# Patient Record
Sex: FEMALE | Race: WHITE | Employment: OTHER | URBAN - METROPOLITAN AREA
[De-identification: names, ages, dates, MRNs, and addresses within clinical notes are randomized per-mention and may not be internally consistent; named-entity substitution may affect disease eponyms.]

---

## 2017-01-16 RX ORDER — ESTRADIOL 0.04 MG/D
PATCH, EXTENDED RELEASE TRANSDERMAL
Qty: 8 PATCH | Refills: 0 | OUTPATIENT
Start: 2017-01-16

## 2017-01-16 NOTE — TELEPHONE ENCOUNTER
judit      Last Written Prescription Date:  10/20/16  Last Fill Quantity: 24,   # refills: 3  Last Office Visit with [facility] [place] primary care provider:  10/19/16  Future Office visit: none    Refill request too soon, Rx denied.

## 2017-08-03 ENCOUNTER — TRANSFERRED RECORDS (OUTPATIENT)
Dept: HEALTH INFORMATION MANAGEMENT | Facility: CLINIC | Age: 72
End: 2017-08-03

## 2017-08-24 ENCOUNTER — NURSE TRIAGE (OUTPATIENT)
Dept: NURSING | Facility: CLINIC | Age: 72
End: 2017-08-24

## 2017-08-24 ENCOUNTER — TELEPHONE (OUTPATIENT)
Dept: NURSING | Facility: CLINIC | Age: 72
End: 2017-08-24

## 2017-08-24 DIAGNOSIS — A60.00 GENITAL HERPES: Primary | ICD-10-CM

## 2017-08-24 RX ORDER — VALACYCLOVIR HYDROCHLORIDE 1 G/1
1000 TABLET, FILM COATED ORAL DAILY
Qty: 30 TABLET | Refills: 0 | Status: SHIPPED | OUTPATIENT
Start: 2017-08-24 | End: 2017-10-24

## 2017-08-24 NOTE — TELEPHONE ENCOUNTER
Valacyclovir 1gm      Last Written Prescription Date:  7/27/15  Last Fill Quantity: 90,   # refills: 3  Last Office Visit with [facility] [place] care provider:  10/19/16  Future Office visit: none    Routing refill request to provider for review/approval because:  Pt due for annual in October. Rx has not been filled for 2 years. Routing to on call provider to review and advise- ok for refill?

## 2017-08-24 NOTE — TELEPHONE ENCOUNTER
Had a spinal injury about one week ago. Then she broke out with genital herpes, for 5 days now. Requesting acyclovir tablets. [facility] on [address]. She has some old tablets, 2 years old. Doesn't know how potent they are. They aren't working so far. Please call.  Jessica Lua RN-[facility]

## 2017-08-24 NOTE — TELEPHONE ENCOUNTER
Sent a high priority message to the clinic for them to prescribe some acyclovir. She has been taking old tablets and they aren't working (2 years old).  Has had the outbreak for 5 days. Spinal injury 7 days ago.   Jessica Lua RN-[facility]

## 2017-08-25 ENCOUNTER — TELEPHONE (OUTPATIENT)
Dept: OBGYN | Facility: CLINIC | Age: 72
End: 2017-08-25

## 2017-08-25 NOTE — TELEPHONE ENCOUNTER
rec'd rejection from pharmacy for Vivelle Dot 0.0375mg CAMILLE as it is nonformulary. Per clinic notes Dr. Waldron made patient aware that brand will not be covered by insurance. No PA being done at this time.    [phone]  ID# [id number]

## 2017-10-24 ENCOUNTER — OFFICE VISIT (OUTPATIENT)
Dept: OBGYN | Facility: CLINIC | Age: 72
End: 2017-10-24
Payer: COMMERCIAL

## 2017-10-24 VITALS
BODY MASS INDEX: 22.56 KG/M2 | SYSTOLIC BLOOD PRESSURE: 148 MMHG | WEIGHT: 135.4 LBS | HEIGHT: 65 IN | DIASTOLIC BLOOD PRESSURE: 80 MMHG

## 2017-10-24 DIAGNOSIS — Z23 NEED FOR PROPHYLACTIC VACCINATION AND INOCULATION AGAINST INFLUENZA: ICD-10-CM

## 2017-10-24 DIAGNOSIS — Z13.6 ENCOUNTER FOR LIPID SCREENING FOR CARDIOVASCULAR DISEASE: ICD-10-CM

## 2017-10-24 DIAGNOSIS — A60.00 GENITAL HERPES SIMPLEX, UNSPECIFIED SITE: ICD-10-CM

## 2017-10-24 DIAGNOSIS — Z13.29 SCREENING FOR THYROID DISORDER: ICD-10-CM

## 2017-10-24 DIAGNOSIS — Z79.890 HORMONE REPLACEMENT THERAPY: ICD-10-CM

## 2017-10-24 DIAGNOSIS — Z13.228 SCREENING FOR METABOLIC DISORDER: ICD-10-CM

## 2017-10-24 DIAGNOSIS — E55.9 VITAMIN D DEFICIENCY: ICD-10-CM

## 2017-10-24 DIAGNOSIS — Z01.419 ENCOUNTER FOR GYNECOLOGICAL EXAMINATION WITHOUT ABNORMAL FINDING: Primary | ICD-10-CM

## 2017-10-24 DIAGNOSIS — Z13.220 ENCOUNTER FOR LIPID SCREENING FOR CARDIOVASCULAR DISEASE: ICD-10-CM

## 2017-10-24 LAB
ALBUMIN SERPL-MCNC: 4.4 G/DL (ref 3.4–5)
ALP SERPL-CCNC: 74 U/L (ref 40–150)
ALT SERPL W P-5'-P-CCNC: 25 U/L (ref 0–50)
ANION GAP SERPL CALCULATED.3IONS-SCNC: 10 MMOL/L (ref 3–14)
AST SERPL W P-5'-P-CCNC: 22 U/L (ref 0–45)
BILIRUB SERPL-MCNC: 0.8 MG/DL (ref 0.2–1.3)
BUN SERPL-MCNC: 15 MG/DL (ref 7–30)
CALCIUM SERPL-MCNC: 9.1 MG/DL (ref 8.5–10.1)
CHLORIDE SERPL-SCNC: 104 MMOL/L (ref 94–109)
CHOLEST SERPL-MCNC: 236 MG/DL
CO2 SERPL-SCNC: 26 MMOL/L (ref 20–32)
CREAT SERPL-MCNC: 0.79 MG/DL (ref 0.52–1.04)
GFR SERPL CREATININE-BSD FRML MDRD: 72 ML/MIN/1.7M2
GLUCOSE SERPL-MCNC: 83 MG/DL (ref 70–99)
HDLC SERPL-MCNC: 91 MG/DL
LDLC SERPL CALC-MCNC: 132 MG/DL
NONHDLC SERPL-MCNC: 145 MG/DL
POTASSIUM SERPL-SCNC: 3.9 MMOL/L (ref 3.4–5.3)
PROT SERPL-MCNC: 7.5 G/DL (ref 6.8–8.8)
SODIUM SERPL-SCNC: 140 MMOL/L (ref 133–144)
TRIGL SERPL-MCNC: 66 MG/DL
TSH SERPL DL<=0.005 MIU/L-ACNC: 2.49 MU/L (ref 0.4–4)

## 2017-10-24 PROCEDURE — 80061 LIPID PANEL: CPT | Performed by: OBSTETRICS & GYNECOLOGY

## 2017-10-24 PROCEDURE — G0008 ADMIN INFLUENZA VIRUS VAC: HCPCS | Performed by: OBSTETRICS & GYNECOLOGY

## 2017-10-24 PROCEDURE — 36415 COLL VENOUS BLD VENIPUNCTURE: CPT | Performed by: OBSTETRICS & GYNECOLOGY

## 2017-10-24 PROCEDURE — 82306 VITAMIN D 25 HYDROXY: CPT | Performed by: OBSTETRICS & GYNECOLOGY

## 2017-10-24 PROCEDURE — 90662 IIV NO PRSV INCREASED AG IM: CPT | Performed by: OBSTETRICS & GYNECOLOGY

## 2017-10-24 PROCEDURE — 99397 PER PM REEVAL EST PAT 65+ YR: CPT | Mod: 25 | Performed by: OBSTETRICS & GYNECOLOGY

## 2017-10-24 PROCEDURE — 80053 COMPREHEN METABOLIC PANEL: CPT | Performed by: OBSTETRICS & GYNECOLOGY

## 2017-10-24 PROCEDURE — 84443 ASSAY THYROID STIM HORMONE: CPT | Performed by: OBSTETRICS & GYNECOLOGY

## 2017-10-24 RX ORDER — ESTRADIOL 0.04 MG/D
1 PATCH, EXTENDED RELEASE TRANSDERMAL
Qty: 24 PATCH | Refills: 3 | Status: SHIPPED | OUTPATIENT
Start: 2017-10-26 | End: 2018-10-24

## 2017-10-24 RX ORDER — VALACYCLOVIR HYDROCHLORIDE 1 G/1
1000 TABLET, FILM COATED ORAL DAILY
Qty: 90 TABLET | Refills: 3 | Status: SHIPPED | OUTPATIENT
Start: 2017-10-24 | End: 2018-12-12

## 2017-10-24 ASSESSMENT — PATIENT HEALTH QUESTIONNAIRE - PHQ9
SUM OF ALL RESPONSES TO PHQ QUESTIONS 1-9: 0
5. POOR APPETITE OR OVEREATING: NOT AT ALL

## 2017-10-24 ASSESSMENT — ANXIETY QUESTIONNAIRES
3. WORRYING TOO MUCH ABOUT DIFFERENT THINGS: NOT AT ALL
1. FEELING NERVOUS, ANXIOUS, OR ON EDGE: NOT AT ALL
5. BEING SO RESTLESS THAT IT IS HARD TO SIT STILL: NOT AT ALL
2. NOT BEING ABLE TO STOP OR CONTROL WORRYING: NOT AT ALL
GAD7 TOTAL SCORE: 0
7. FEELING AFRAID AS IF SOMETHING AWFUL MIGHT HAPPEN: NOT AT ALL
6. BECOMING EASILY ANNOYED OR IRRITABLE: NOT AT ALL

## 2017-10-24 NOTE — PROGRESS NOTES

## 2017-10-24 NOTE — PROGRESS NOTES
Cheyenne is a 72 year old  female who presents for annual exam.     Besides routine health maintenance, she has no other health concerns today .    Do you have a Health Care Directive?: Yes: Patient states has Advance Directive and will bring in a copy to clinic.    Fall risk:   Fallen 2 or more times in the past year?: No  Any fall with injury in the past year?: No      HPI:  The patient's PCP is Johanna Waldron MD.    Patient doing well  Prior hysterectomy  Had herniated disc this year  Refill vivelle dot  Stopped smoking        GYNECOLOGIC HISTORY:No LMP recorded. Patient has had a hysterectomy..   reports that she has quit smoking. Her smoking use included Cigarettes. She has a 8.00 pack-year smoking history. She quit smokeless tobacco use about 36 years ago.      Patient is sexually active.  STD testing offered?  Declined  Last PHQ-9 score on record=   PHQ-9 SCORE 10/24/2017   Total Score 0     Last GAD7 score on record=   DIDIER-7 SCORE 10/19/2016 10/24/2017   Total Score 0 0     Alcohol Score = 2    HEALTH MAINTENANCE:  Cholesterol: 13   Total= 243, Triglycerides=53, HDL=99, GLE=421, TSH=2.55 -Patient is fasting for labs today  Last Mammo: 8/3/17, Result: normal, Next Mammo: 2018. Has done with [facility]   Pap: EDEN  DEXA:  7/16/15  Colonoscopy:  5/3/16, Result:  normal, Next Colonoscopy: due in 2 years.    Health maintenance updated:  yes    HISTORY:  Obstetric History       T2      L0     SAB0   TAB0   Ectopic0   Multiple0   Live Births0       # Outcome Date GA Lbr Kike/2nd Weight Sex Delivery Anes PTL Lv   2 Term            1 Term                 Patient Active Problem List   Diagnosis     IBS (irritable bowel syndrome)     Diverticulitis of colon     OAB (overactive bladder)     Past Surgical History:   Procedure Laterality Date     ABDOMINOPLASTY       C BREAST AUGMENTATION       HYSTERECTOMY, EDEN  1998     PARATHYROIDECTOMY        Social History   Substance Use  "Topics     Smoking status: Former Smoker     Packs/day: 1.00     Years: 8.00     Types: Cigarettes     Smokeless tobacco: Former User     Quit date: 9/13/1981     Alcohol use Yes      Comment: very light           Current Outpatient Prescriptions   Medication Sig     valACYclovir (VALTREX) 1000 mg tablet Take 1 tablet (1,000 mg) by mouth daily     Lactobacillus (PROBIOTIC ACIDOPHILUS PO)      RaNITidine HCl (ZANTAC 75 PO)      VIVELLE-DOT 0.0375 MG/24HR Place 1 patch onto the skin twice a week     cycloSPORINE (RESTASIS) 0.05 % ophthalmic emulsion 1 drop as needed.     Multiple Vitamins-Minerals (MULTIVITAL) TABS Take 1 tablet by mouth daily.     Calcium Carbonate-Vitamin D (CALCIUM + D) 600-200 MG-UNIT per tablet Take 1 tablet by mouth 2 times daily.     No current facility-administered medications for this visit.        Allergies   Allergen Reactions     Flagyl [Metronidazole Hcl]      Thrush and gerd       Past medical, surgical, social and family history were reviewed and updated in EPIC.    ROS:   12 point review of systems negative other than symptoms noted below.    EXAM:  /80  Ht 5' 4.5\" (1.638 m)  Wt 135 lb 6.4 oz (61.4 kg)  BMI 22.88 kg/m2   BMI: Body mass index is 22.88 kg/(m^2).    EXAM:  Constitutional: Appearance: Well nourished, well developed alert, in no acute distress  Neck:  Lymph Nodes:  No lymphadenopathy present    Thyroid:  Gland size normal, nontender, no nodules or masses present  on palpation  Chest:  Respiratory Effort:  Breathing unlabored  Cardiovascular:Heart    Auscultation:  Regular rate, normal rhythm, no murmurs present  Breasts: Inspection of Breasts:  No lymphadenopathy present., Palpation of Breasts and Axillae:  No masses present on palpation, no breast tenderness., Axillary Lymph Nodes:  No lymphadenopathy present. and No nodularity, asymmetry or nipple discharge bilaterally.  Gastrointestinal:  Abdominal Examination:  Abdomen nontender to palpation, tone normal " without     rigidity or guarding, no masses present, umbilicus without lesions    Liver and speen:  No hepatomegaly present, liver nontender to palpation    Hernias:  No hernias present  Lymphatic: Lymph Nodes:  No other lymphadenopathy present  Skin:  General Inspection:  No rashes present, no lesions present, no areas of  discoloration.    Genitalia and Groin:  No rashes present, no lesions present, no areas of  discoloration, no masses present  Neurologic/Psychiatric:    Mental Status:  Oriented X3     Pelvic Exam:  External Genitalia:     Normal appearance for age, no discharge present, no tenderness present, no inflammatory lesions present, color normal  Vagina:     Normal vaginal vault without central or paravaginal defects, no discharge present, no inflammatory lesions present, no masses present  Bladder:     Nontender to palpation  Urethra:   Urethral Body:  Urethra palpation normal, urethra structural support normal   Urethral Meatus:  No erythema or lesions present  Cervix:     Surgically absent  Uterus:     Surgically absent  Adnexa:     No adnexal tenderness present, no adnexal masses present  Perineum:     Perineum within normal limits, no evidence of trauma, no rashes or skin lesions present  Anus:     Anus within normal limits, no hemorrhoids present  Inguinal Lymph Nodes:     No lymphadenopathy present  Pubic Hair:     Normal pubic hair distribution for age  Genitalia and Groin:     No rashes present, no lesions present, no areas of discoloration, no masses present      COUNSELING:   Reviewed preventive health counseling, as reflected in patient instructions       Regular exercise       Healthy diet/nutrition    BMI:  Body mass index is 22.88 kg/(m^2).     reports that she has quit smoking. Her smoking use included Cigarettes. She has a 8.00 pack-year smoking history. She quit smokeless tobacco use about 36 years ago.        ASSESSMENT:  72 year old female with satisfactory annual exam.  ICD-10-CM     1. Encounter for gynecological examination without abnormal finding Z01.419    2. Hormone replacement therapy Z79.890    3. Genital herpes A60.00        PLAN:  Return 1 years  Refilled estrogen patch and valtrex  Annual mammogram    Johanna Waldron MD

## 2017-10-24 NOTE — MR AVS SNAPSHOT
"              After Visit Summary   10/24/2017    Cheyenne Mckeon    MRN: [id number]           Patient Information     Date Of Birth          1945        Visit Information        Provider Department      10/24/2017 8:40 AM Johanna Waldron MD [facility]        Today's Diagnoses     Encounter for gynecological examination without abnormal finding    -  1    Hormone replacement therapy        Genital herpes        Encounter for lipid screening for cardiovascular disease        Screening for metabolic disorder        Screening for thyroid disorder        Vitamin D deficiency        Need for prophylactic vaccination and inoculation against influenza           Follow-ups after your visit        Who to contact     If you have questions or need follow up information about today's clinic visit or your schedule please contact [facility] directly at [phone].  Normal or non-critical lab and imaging results will be communicated to you by MyChart, letter or phone within 4 business days after the clinic has received the results. If you do not hear from us within 7 days, please contact the clinic through [employer] or phone. If you have a critical or abnormal lab result, we will notify you by phone as soon as possible.  Submit refill requests through [facility] or call your pharmacy and they will forward the refill request to us. Please allow 3 business days for your refill to be completed.          Additional Information About Your Visit        MyChart Information     [facility] lets you send messages to your doctor, view your test results, renew your prescriptions, schedule appointments and more. To sign up, go to [email or web address] . Click on \"Log in\" on the left side of the screen, which will take you to the Welcome page. Then click on \"Sign up Now\" on the right side of the page.     You will be asked to enter the access code listed below, as well as some personal information. " "Please follow the directions to create your username and password.     Your access code is: [email or web address]  Expires: 2018  9:44 AM     Your access code will  in 90 days. If you need help or a new code, please call your [place] clinic or [phone].        Care EveryWhere ID     This is your Care EveryWhere ID. This could be used by other organizations to access your [place] medical records  [id number]        Your Vitals Were     Height BMI (Body Mass Index)                5' 4.5\" (1.638 m) 22.88 kg/m2           Blood Pressure from Last 3 Encounters:   10/24/17 148/80   10/19/16 128/90   10/28/15 120/84    Weight from Last 3 Encounters:   10/24/17 135 lb 6.4 oz (61.4 kg)   10/19/16 141 lb (64 kg)   10/28/15 137 lb (62.1 kg)              We Performed the Following     Comprehensive metabolic panel     FLU VACCINE, INCREASED ANTIGEN, PRESV FREE, AGE 65+ [13393]     Lipid panel reflex to direct LDL Fasting     TSH     Vaccine Administration, Initial [40671]     Vitamin D Deficiency        Primary Care Provider Office Phone # Fax #    Johanna Waldron [phone] [address] [address]        Equal Access to Services     [facility]: Hadii milton ku hadasho Soomaali, waaxda luqadaha, qaybta kaalmada adeegcatrachito, meng white . So [facility] [phone].    ATENCIÓN: Si habla español, tiene a nunez disposición servicios gratuitos de asistencia lingüística. Llame al [phone].    We comply with applicable federal civil rights laws and [place] laws. We do not discriminate on the basis of race, color, national origin, age, disability, sex, sexual orientation, or gender identity.            Thank you!     Thank you for choosing [facility]  for your care. Our goal is always to provide you with excellent care. Hearing back from our patients is one way we can continue to improve our services. Please take a few minutes to complete " the written survey that you may receive in the mail after your visit with us. Thank you!             Your Updated Medication List - Protect others around you: Learn how to safely use, store and throw away your medicines at www.disposemymeds.org.          This list is accurate as of: 10/24/17  9:44 AM.  Always use your most recent med list.                   Brand Name Dispense Instructions for use Diagnosis    calcium + D 600-200 MG-UNIT Tabs   Generic drug:  calcium carbonate-vitamin D      Take 1 tablet by mouth 2 times daily.        MULTIVITAL Tabs      Take 1 tablet by mouth daily.        PROBIOTIC ACIDOPHILUS PO           RESTASIS 0.05 % ophthalmic emulsion   Generic drug:  cycloSPORINE      1 drop as needed.        valACYclovir 1000 mg tablet    VALTREX    30 tablet    Take 1 tablet (1,000 mg) by mouth daily    Genital herpes       VIVELLE-DOT 0.0375 MG/24HR BIW patch   Generic drug:  estradiol     24 patch    Place 1 patch onto the skin twice a week    Hormone replacement therapy       ZANTAC 75 PO

## 2017-10-25 LAB — DEPRECATED CALCIDIOL+CALCIFEROL SERPL-MC: 49 UG/L (ref 20–75)

## 2017-10-25 ASSESSMENT — ANXIETY QUESTIONNAIRES: GAD7 TOTAL SCORE: 0

## 2018-03-21 ENCOUNTER — TELEPHONE (OUTPATIENT)
Dept: OBGYN | Facility: CLINIC | Age: 73
End: 2018-03-21

## 2018-03-21 NOTE — TELEPHONE ENCOUNTER
Prior Authorization Retail Medication Request    Medication/Dose: brad heck 0.0375mg patch  ICD code (if different than what is on RX):    Previously Tried and Failed:    Rationale:  Has been stable on this med    Insurance Name:    Insurance ID:  [id number]  Phone: [phone]      Pharmacy Information (if different than what is on RX)  Name:    Phone:

## 2018-03-23 NOTE — TELEPHONE ENCOUNTER
Central Prior Authorization Team   Phone: [phone]      PA Initiation    Medication: vivelle dot 0.0375mg patch-Initiated  Insurance Company: Medicare Blue - Phone [phone] Fax [phone]  Pharmacy Filling the Rx: [facility] [address] [place] [address] [place], [place] - [address] AT [facility] [address] [place] [address]  Filling Pharmacy Phone: [phone]  Filling Pharmacy Fax:    Start Date: 3/23/2018

## 2018-03-26 NOTE — TELEPHONE ENCOUNTER
Prior Authorization Approval    Authorization Effective Date: 12/24/2017  Authorization Expiration Date: 3/24/2019  Medication: vivelle dot 0.0375mg patch-APPROVED  Approved Dose/Quantity:   Reference #:     Insurance Company: Medicare Blue - Phone [phone] Fax [phone]  Expected CoPay: $61     CoPay Card Available:      Foundation Assistance Needed:    Which Pharmacy is filling the prescription (Not needed for infusion/clinic administered): [facility] - [place], [place] - [address] AT [facility] [place] [address]  Pharmacy Notified: Yes  Patient Notified: Yes

## 2018-08-07 ENCOUNTER — TRANSFERRED RECORDS (OUTPATIENT)
Dept: HEALTH INFORMATION MANAGEMENT | Facility: CLINIC | Age: 73
End: 2018-08-07

## 2018-08-15 ENCOUNTER — OFFICE VISIT (OUTPATIENT)
Dept: OBGYN | Facility: CLINIC | Age: 73
End: 2018-08-15
Payer: COMMERCIAL

## 2018-08-15 VITALS
WEIGHT: 140.6 LBS | BODY MASS INDEX: 23.76 KG/M2 | DIASTOLIC BLOOD PRESSURE: 74 MMHG | SYSTOLIC BLOOD PRESSURE: 118 MMHG | HEART RATE: 84 BPM

## 2018-08-15 DIAGNOSIS — R30.0 DYSURIA: Primary | ICD-10-CM

## 2018-08-15 LAB
ALBUMIN UR-MCNC: NEGATIVE MG/DL
APPEARANCE UR: CLEAR
BILIRUB UR QL STRIP: NEGATIVE
COLOR UR AUTO: YELLOW
GLUCOSE UR STRIP-MCNC: NEGATIVE MG/DL
HGB UR QL STRIP: NEGATIVE
KETONES UR STRIP-MCNC: NEGATIVE MG/DL
LEUKOCYTE ESTERASE UR QL STRIP: NEGATIVE
NITRATE UR QL: NEGATIVE
NON-SQ EPI CELLS #/AREA URNS LPF: NORMAL /LPF
PH UR STRIP: 7 PH (ref 5–7)
RBC #/AREA URNS AUTO: NORMAL /HPF
SOURCE: NORMAL
SP GR UR STRIP: 1.01 (ref 1–1.03)
UROBILINOGEN UR STRIP-ACNC: 1 EU/DL (ref 0.2–1)
WBC #/AREA URNS AUTO: NORMAL /HPF

## 2018-08-15 PROCEDURE — 87086 URINE CULTURE/COLONY COUNT: CPT | Performed by: NURSE PRACTITIONER

## 2018-08-15 PROCEDURE — 99212 OFFICE O/P EST SF 10 MIN: CPT | Performed by: NURSE PRACTITIONER

## 2018-08-15 PROCEDURE — 81001 URINALYSIS AUTO W/SCOPE: CPT | Performed by: NURSE PRACTITIONER

## 2018-08-15 NOTE — PROGRESS NOTES
SUBJECTIVE:                                                   Cheyenne Mckeon is a 72 year old female who presents to clinic today for the following health issue(s):  Patient presents with:  Urinary Problem: Patient was diagnosed with UTI from [facility]. Took macrobid for UTI but felt sick from taking it. Patient states she has chills, body aches, fever and joint pains since yesterday. Patient stopped taking the Macrobid.       HPI: patient was seen at [facility] 2 days ago and was told she had a UTI.  She was given macrobid and had a  Reaction to medication.      No LMP recorded. Patient has had a hysterectomy..   Patient is sexually active, .  Using hysterectomy for contraception.    reports that she has quit smoking. Her smoking use included Cigarettes. She has a 8.00 pack-year smoking history. She quit smokeless tobacco use about 36 years ago.    STD testing offered?  Declined    Health maintenance updated:  yes    Today's PHQ-2 Score:   PHQ-2 (  [employer]) 10/28/2015   Q1: Little interest or pleasure in doing things 0   Q2: Feeling down, depressed or hopeless 0   PHQ-2 Score 0     Today's PHQ-9 Score:   PHQ-9 SCORE 10/24/2017   Total Score 0     Today's DIDIER-7 Score:   DIDIER-7 SCORE 10/24/2017   Total Score 0       Problem list and histories reviewed & adjusted, as indicated.  Additional history: as documented.    Patient Active Problem List   Diagnosis     IBS (irritable bowel syndrome)     Diverticulitis of colon     OAB (overactive bladder)     Past Surgical History:   Procedure Laterality Date     ABDOMINOPLASTY       C BREAST AUGMENTATION       HYSTERECTOMY, EDEN       PARATHYROIDECTOMY        Social History   Substance Use Topics     Smoking status: Former Smoker     Packs/day: 1.00     Years: 8.00     Types: Cigarettes     Smokeless tobacco: Former User     Quit date: 1981     Alcohol use Yes      Comment: very light           Current Outpatient Prescriptions   Medication Sig      Calcium Carbonate-Vitamin D (CALCIUM + D) 600-200 MG-UNIT per tablet Take 1 tablet by mouth 2 times daily.     cycloSPORINE (RESTASIS) 0.05 % ophthalmic emulsion 1 drop as needed.     Lactobacillus (PROBIOTIC ACIDOPHILUS PO)      Multiple Vitamins-Minerals (MULTIVITAL) TABS Take 1 tablet by mouth daily.     RaNITidine HCl (ZANTAC 75 PO)      valACYclovir (VALTREX) 1000 mg tablet Take 1 tablet (1,000 mg) by mouth daily     VIVELLE-DOT 0.0375 MG/24HR BIW patch Place 1 patch onto the skin twice a week     No current facility-administered medications for this visit.      Allergies   Allergen Reactions     Flagyl [Metronidazole Hcl]      Thrush and gerd     Macrobid [Nitrofurantoin]        ROS:  12 point review of systems negative other than symptoms noted below.  Genitourinary: Painful Urination    OBJECTIVE:     /74  Pulse 84  Wt 140 lb 9.6 oz (63.8 kg)  Breastfeeding? No  BMI 23.76 kg/m2  Body mass index is 23.76 kg/(m^2).    Exam:  Constitutional:  Appearance: Well nourished, well developed alert, in no acute distress   Patient states she was very nauseated, running a fever and felt severe body aches yesterday after taking macrobid x 2.  She stopped the medication and today she feels normal.  Wants urine checked to make sure she doesn/t have a UTI.  Patient denies any dysuria today, but wants a UC to make sure.    In-Clinic Test Results:  Results for orders placed or performed in visit on 08/15/18 (from the past 24 hour(s))   UA with Microscopic   Result Value Ref Range    Color Urine Yellow     Appearance Urine Clear     Glucose Urine Negative NEG^Negative mg/dL    Bilirubin Urine Negative NEG^Negative    Ketones Urine Negative NEG^Negative mg/dL    Specific Gravity Urine 1.010 1.003 - 1.035    pH Urine 7.0 5.0 - 7.0 pH    Protein Albumin Urine Negative NEG^Negative mg/dL    Urobilinogen Urine 1.0 0.2 - 1.0 EU/dL    Nitrite Urine Negative NEG^Negative    Blood Urine Negative NEG^Negative    Leukocyte  Esterase Urine Negative NEG^Negative    Source Midstream Urine     WBC Urine 0 - 5 OTO5^0 - 5 /HPF    RBC Urine O - 2 OTO2^O - 2 /HPF    Squamous Epithelial /LPF Urine Few FEW^Few /LPF       ASSESSMENT/PLAN:                                                        ICD-10-CM    1. Dysuria R30.0 UA with Microscopic     Urine Culture Aerobic Bacterial       There are no Patient Instructions on file for this visit.    Will send a UC to make sure no infection.  Will notify patient with results when available  Return prn.    FISH Harris CNP  [facility]

## 2018-08-15 NOTE — MR AVS SNAPSHOT
"              After Visit Summary   8/15/2018    Cheyenne Mckeon    MRN: [id number]           Patient Information     Date Of Birth          1945        Visit Information        Provider Department      8/15/2018 10:00 AM Magdalene Lee APRN CNP [facility]        Today's Diagnoses     Dysuria    -  1       Follow-ups after your visit        Follow-up notes from your care team     Return if symptoms worsen or fail to improve.      Who to contact     If you have questions or need follow up information about today's clinic visit or your schedule please contact [facility] directly at [phone].  Normal or non-critical lab and imaging results will be communicated to you by [employer], letter or phone within 4 business days after the clinic has received the results. If you do not hear from us within 7 days, please contact the clinic through [employer] or phone. If you have a critical or abnormal lab result, we will notify you by phone as soon as possible.  Submit refill requests through [employer] or call your pharmacy and they will forward the refill request to us. Please allow 3 business days for your refill to be completed.          Additional Information About Your Visit        MyChart Information     [employer] lets you send messages to your doctor, view your test results, renew your prescriptions, schedule appointments and more. To sign up, go to [email or web address] . Click on \"Log in\" on the left side of the screen, which will take you to the Welcome page. Then click on \"Sign up Now\" on the right side of the page.     You will be asked to enter the access code listed below, as well as some personal information. Please follow the directions to create your username and password.     Your access code is: [email or web address]  Expires: 2018  9:45 AM     Your access code will  in 90 days. If you need help or a new code, please call your [place] clinic or " [phone].        Care EveryWhere ID     This is your Care EveryWhere ID. This could be used by other organizations to access your [place] medical records  [id number]        Your Vitals Were     Pulse Breastfeeding? BMI (Body Mass Index)             84 No 23.76 kg/m2          Blood Pressure from Last 3 Encounters:   08/15/18 118/74   10/24/17 148/80   10/19/16 128/90    Weight from Last 3 Encounters:   08/15/18 140 lb 9.6 oz (63.8 kg)   10/24/17 135 lb 6.4 oz (61.4 kg)   10/19/16 141 lb (64 kg)              We Performed the Following     UA with Microscopic     Urine Culture Aerobic Bacterial        Primary Care Provider Office Phone # Fax #    Johanna Waldron [phone] [address] [address]        Equal Access to Services     [facility]: Hadii aad ku hadasho Soomaali, waaxda luqadaha, qaybta kaalmada adeegyada, meng arguellesin hayverna white . So [facility] [place] [phone].    ATENCIÓN: Si habla español, tiene a nunez disposición servicios gratuitos de asistencia lingüística. Llame al [phone].    We comply with applicable federal civil rights laws and [place] laws. We do not discriminate on the basis of race, color, national origin, age, disability, sex, sexual orientation, or gender identity.            Thank you!     Thank you for choosing [facility]  for your care. Our goal is always to provide you with excellent care. Hearing back from our patients is one way we can continue to improve our services. Please take a few minutes to complete the written survey that you may receive in the mail after your visit with us. Thank you!             Your Updated Medication List - Protect others around you: Learn how to safely use, store and throw away your medicines at www.disposemymeds.org.          This list is accurate as of 8/15/18 10:22 AM.  Always use your most recent med list.                   Brand Name Dispense Instructions for use  Diagnosis    calcium + D 600-200 MG-UNIT Tabs   Generic drug:  calcium carbonate-vitamin D      Take 1 tablet by mouth 2 times daily.        MULTIVITAL Tabs      Take 1 tablet by mouth daily.        PROBIOTIC ACIDOPHILUS PO           RESTASIS 0.05 % ophthalmic emulsion   Generic drug:  cycloSPORINE      1 drop as needed.        valACYclovir 1000 mg tablet    VALTREX    90 tablet    Take 1 tablet (1,000 mg) by mouth daily    Genital herpes simplex, unspecified site       VIVELLE-DOT 0.0375 MG/24HR BIW patch   Generic drug:  estradiol     24 patch    Place 1 patch onto the skin twice a week    Hormone replacement therapy       ZANTAC 75 PO

## 2018-08-16 LAB
BACTERIA SPEC CULT: NO GROWTH
Lab: NORMAL
SPECIMEN SOURCE: NORMAL

## 2018-10-24 DIAGNOSIS — Z79.890 HORMONE REPLACEMENT THERAPY: ICD-10-CM

## 2018-10-24 NOTE — TELEPHONE ENCOUNTER
"Requested Prescriptions   Pending Prescriptions Disp Refills     VIVELLE-DOT 0.0375 MG/24HR BIW patch [Pharmacy Med Name: VIVELLE-DOT 0.0375MG PTCH(TWICE WK)] 24 patch 0     Sig: APPLY 1 PATCH EXTERNALLY TO THE SKIN 2 TIMES A WEEK    Hormone Replacement Therapy Passed    10/24/2018  8:21 AM       Passed - Blood pressure under 140/90 in past 12 months    BP Readings from Last 3 Encounters:   08/15/18 118/74   10/24/17 148/80   10/19/16 128/90                Passed - Recent (12 mo) or future (30 days) visit within the authorizing provider's specialty    Patient had office visit in the last 12 months or has a visit in the next 30 days with authorizing provider or within the authorizing provider's specialty.  See \"Patient Info\" tab in inbasket, or \"Choose Columns\" in Meds & Orders section of the refill encounter.             Passed - Patient has mammogram in past 2 years on file if age 50-75       Passed - Patient is 18 years of age or older       Passed - No active pregnancy on record       Passed - No positive pregnancy test on record in past 12 months        Last Written Prescription Date:  10/26/2017  Last Fill Quantity: 24,  # refills: 3   Last office visit: 8/15/2018 with prescribing provider:  Lula Lee   Future Office Visit:  None    "

## 2018-10-26 RX ORDER — ESTRADIOL 0.04 MG/D
1 PATCH, EXTENDED RELEASE TRANSDERMAL
Qty: 24 PATCH | Refills: 0 | Status: SHIPPED | OUTPATIENT
Start: 2018-10-29 | End: 2018-12-12

## 2018-10-26 NOTE — TELEPHONE ENCOUNTER
Medication is being filled for 1 time refill only due to:  Patient needs to be seen because it has been more than one year since last visit. Reminder sent via pharmacy

## 2018-12-12 ENCOUNTER — OFFICE VISIT (OUTPATIENT)
Dept: OBGYN | Facility: CLINIC | Age: 73
End: 2018-12-12
Payer: COMMERCIAL

## 2018-12-12 VITALS
BODY MASS INDEX: 23.22 KG/M2 | HEART RATE: 78 BPM | WEIGHT: 136 LBS | SYSTOLIC BLOOD PRESSURE: 116 MMHG | HEIGHT: 64 IN | DIASTOLIC BLOOD PRESSURE: 78 MMHG

## 2018-12-12 DIAGNOSIS — Z13.6 ENCOUNTER FOR LIPID SCREENING FOR CARDIOVASCULAR DISEASE: Primary | ICD-10-CM

## 2018-12-12 DIAGNOSIS — Z13.228 SCREENING FOR METABOLIC DISORDER: ICD-10-CM

## 2018-12-12 DIAGNOSIS — Z79.890 HORMONE REPLACEMENT THERAPY: ICD-10-CM

## 2018-12-12 DIAGNOSIS — A60.00 GENITAL HERPES SIMPLEX, UNSPECIFIED SITE: ICD-10-CM

## 2018-12-12 DIAGNOSIS — Z13.220 ENCOUNTER FOR LIPID SCREENING FOR CARDIOVASCULAR DISEASE: Primary | ICD-10-CM

## 2018-12-12 LAB
ALBUMIN SERPL-MCNC: 4 G/DL (ref 3.4–5)
ALP SERPL-CCNC: 66 U/L (ref 40–150)
ALT SERPL W P-5'-P-CCNC: 27 U/L (ref 0–50)
ANION GAP SERPL CALCULATED.3IONS-SCNC: 9 MMOL/L (ref 3–14)
AST SERPL W P-5'-P-CCNC: 16 U/L (ref 0–45)
BILIRUB SERPL-MCNC: 0.7 MG/DL (ref 0.2–1.3)
BUN SERPL-MCNC: 18 MG/DL (ref 7–30)
CALCIUM SERPL-MCNC: 9.1 MG/DL (ref 8.5–10.1)
CHLORIDE SERPL-SCNC: 103 MMOL/L (ref 94–109)
CHOLEST SERPL-MCNC: 247 MG/DL
CO2 SERPL-SCNC: 26 MMOL/L (ref 20–32)
CREAT SERPL-MCNC: 0.83 MG/DL (ref 0.52–1.04)
GFR SERPL CREATININE-BSD FRML MDRD: 67 ML/MIN/1.7M2
GLUCOSE SERPL-MCNC: 83 MG/DL (ref 70–99)
HDLC SERPL-MCNC: 87 MG/DL
LDLC SERPL CALC-MCNC: 149 MG/DL
NONHDLC SERPL-MCNC: 160 MG/DL
POTASSIUM SERPL-SCNC: 3.7 MMOL/L (ref 3.4–5.3)
PROT SERPL-MCNC: 7 G/DL (ref 6.8–8.8)
SODIUM SERPL-SCNC: 138 MMOL/L (ref 133–144)
TRIGL SERPL-MCNC: 53 MG/DL

## 2018-12-12 PROCEDURE — 36415 COLL VENOUS BLD VENIPUNCTURE: CPT | Performed by: OBSTETRICS & GYNECOLOGY

## 2018-12-12 PROCEDURE — 99213 OFFICE O/P EST LOW 20 MIN: CPT | Performed by: OBSTETRICS & GYNECOLOGY

## 2018-12-12 PROCEDURE — 80061 LIPID PANEL: CPT | Performed by: OBSTETRICS & GYNECOLOGY

## 2018-12-12 PROCEDURE — 80053 COMPREHEN METABOLIC PANEL: CPT | Performed by: OBSTETRICS & GYNECOLOGY

## 2018-12-12 RX ORDER — VALACYCLOVIR HYDROCHLORIDE 1 G/1
1000 TABLET, FILM COATED ORAL DAILY
Qty: 90 TABLET | Refills: 3 | Status: SHIPPED | OUTPATIENT
Start: 2018-12-12 | End: 2020-08-18

## 2018-12-12 RX ORDER — ESTRADIOL 0.04 MG/D
1 PATCH, EXTENDED RELEASE TRANSDERMAL
Qty: 24 PATCH | Refills: 3 | Status: SHIPPED | OUTPATIENT
Start: 2018-12-13 | End: 2019-12-31

## 2018-12-12 ASSESSMENT — MIFFLIN-ST. JEOR: SCORE: 1116.89

## 2018-12-12 ASSESSMENT — ANXIETY QUESTIONNAIRES
7. FEELING AFRAID AS IF SOMETHING AWFUL MIGHT HAPPEN: NOT AT ALL
1. FEELING NERVOUS, ANXIOUS, OR ON EDGE: NOT AT ALL
6. BECOMING EASILY ANNOYED OR IRRITABLE: NOT AT ALL
2. NOT BEING ABLE TO STOP OR CONTROL WORRYING: NOT AT ALL
5. BEING SO RESTLESS THAT IT IS HARD TO SIT STILL: NOT AT ALL
IF YOU CHECKED OFF ANY PROBLEMS ON THIS QUESTIONNAIRE, HOW DIFFICULT HAVE THESE PROBLEMS MADE IT FOR YOU TO DO YOUR WORK, TAKE CARE OF THINGS AT HOME, OR GET ALONG WITH OTHER PEOPLE: NOT DIFFICULT AT ALL

## 2018-12-12 ASSESSMENT — PATIENT HEALTH QUESTIONNAIRE - PHQ9
SUM OF ALL RESPONSES TO PHQ QUESTIONS 1-9: 0
5. POOR APPETITE OR OVEREATING: NOT AT ALL

## 2018-12-12 NOTE — PROGRESS NOTES
SUBJECTIVE:                                                   Cheyenne Mckeon is a 73 year old female who presents to clinic today for the following health issue(s):  Patient presents with:  Other: LTD Yearly           HPI:  Patient is doing well  Prior EDEN  Has primary doctor  Needs refill on her hormone replacement therapy  Has been having some palpitations occ  Former smoker in distant past  Has breast implants  Normal Vit D and TSH last years, not on meds    No LMP recorded. Patient has had a hysterectomy..   Patient is sexually active, .  Using menopause for contraception.    reports that she has quit smoking. Her smoking use included cigarettes. She has a 8.00 pack-year smoking history. She quit smokeless tobacco use about 37 years ago.    STD testing offered?  Declined    Health maintenance updated:  yes    Today's PHQ-2 Score:   PHQ-2 (  [employer]) 10/28/2015   Q1: Little interest or pleasure in doing things 0   Q2: Feeling down, depressed or hopeless 0   PHQ-2 Score 0     Today's PHQ-9 Score:   PHQ-9 SCORE 2018   PHQ-9 Total Score 0     Today's DIDIER-7 Score:   DIDIER-7 SCORE 10/24/2017   Total Score 0       Problem list and histories reviewed & adjusted, as indicated.  Additional history: as documented.    Patient Active Problem List   Diagnosis     IBS (irritable bowel syndrome)     Diverticulitis of colon     OAB (overactive bladder)     Past Surgical History:   Procedure Laterality Date     ABDOMINOPLASTY       C BREAST AUGMENTATION       HYSTERECTOMY, EDEN       PARATHYROIDECTOMY        Social History     Tobacco Use     Smoking status: Former Smoker     Packs/day: 1.00     Years: 8.00     Pack years: 8.00     Types: Cigarettes     Smokeless tobacco: Former User     Quit date: 1981   Substance Use Topics     Alcohol use: Yes     Comment: very light           Current Outpatient Medications   Medication Sig     Calcium Carbonate-Vitamin D (CALCIUM + D) 600-200 MG-UNIT per tablet  "Take 1 tablet by mouth 2 times daily.     cycloSPORINE (RESTASIS) 0.05 % ophthalmic emulsion 1 drop as needed.     Lactobacillus (PROBIOTIC ACIDOPHILUS PO)      Multiple Vitamins-Minerals (MULTIVITAL) TABS Take 1 tablet by mouth daily.     RaNITidine HCl (ZANTAC 75 PO)      valACYclovir (VALTREX) 1000 mg tablet Take 1 tablet (1,000 mg) by mouth daily     VIVELLE-DOT 0.0375 MG/24HR BIW patch Place 1 patch onto the skin twice a week No further refills until seen for annual exam     No current facility-administered medications for this visit.      Allergies   Allergen Reactions     Flagyl [Metronidazole Hcl]      Thrush and gerd     Macrobid [Nitrofurantoin]        ROS:  12 point review of systems negative other than symptoms noted below.  Cardiovascular: Palpitations    OBJECTIVE:     /78   Pulse 78   Ht 1.626 m (5' 4\")   Wt 61.7 kg (136 lb)   BMI 23.34 kg/m    Body mass index is 23.34 kg/m .    Exam:  Constitutional:  Appearance: Well nourished, well developed alert, in no acute distress  Chest:  Respiratory Effort:  Breathing unlabored  Breasts:  Inspection of Breasts:  No lymphadenopathy present; Palpation of Breasts and Axillae:  No masses present on palpation, no breast tenderness Axillary Lymph Nodes:  No lymphadenopathy present  Neurologic/Psychiatric:  Mental Status:  Oriented X3   Pelvic Exam:  External Genitalia:     Normal appearance for age, no discharge present, no tenderness present, no inflammatory lesions present, color normal  Vagina:     Normal vaginal vault without central or paravaginal defects, no discharge present, no inflammatory lesions present, no masses present  Bladder:     Nontender to palpation  Urethra:   Urethral Body:  Urethra palpation normal, urethra structural support normal   Urethral Meatus:  No erythema or lesions present  Cervix:     Surgically absent  Uterus:     Surgically absent  Adnexa:     No adnexal tenderness present, no adnexal masses present  Perineum:  "    Perineum within normal limits, no evidence of trauma, no rashes or skin lesions present  Anus:     Anus within normal limits, no hemorrhoids present  Inguinal Lymph Nodes:     No lymphadenopathy present  Pubic Hair:     Normal pubic hair distribution for age  Genitalia and Groin:     No rashes present, no lesions present, no areas of discoloration, no masses present       In-Clinic Test Results:  No results found for this or any previous visit (from the past 24 hour(s)).    ASSESSMENT/PLAN:                                                        ICD-10-CM    1. Hormone replacement therapy Z79.890    2. Genital herpes simplex, unspecified site A60.00        There are no Patient Instructions on file for this visit.    Check CMP and lipids  See primary care about her palpitations, this should be done right away    Johanna Waldron MD  [facility]

## 2019-02-18 ENCOUNTER — NEW PATIENT EMERGENCY (OUTPATIENT)
Dept: URBAN - METROPOLITAN AREA CLINIC 43 | Facility: CLINIC | Age: 74
End: 2019-02-18

## 2019-02-18 DIAGNOSIS — H04.123: ICD-10-CM

## 2019-02-18 DIAGNOSIS — H43.813: ICD-10-CM

## 2019-02-18 DIAGNOSIS — H25.9: ICD-10-CM

## 2019-02-18 PROCEDURE — 92002 INTRM OPH EXAM NEW PATIENT: CPT

## 2019-02-18 ASSESSMENT — TONOMETRY
OS_IOP_MMHG: 12
OD_IOP_MMHG: 13

## 2019-02-18 ASSESSMENT — VISUAL ACUITY
OS_PH: 20/50+2
OD_PH: 20/40+1
OD_SC: J3
OD_SC: 20/50-2
OS_SC: 20/80

## 2019-05-28 DIAGNOSIS — Z79.890 HORMONE REPLACEMENT THERAPY: ICD-10-CM

## 2019-05-28 NOTE — TELEPHONE ENCOUNTER
"Requested Prescriptions   Pending Prescriptions Disp Refills     VIVELLE-DOT 0.0375 MG/24HR BIW patch 24 patch 3     Sig: Place 1 patch onto the skin twice a week No further refills until seen for annual exam       Hormone Replacement Therapy Passed - 5/28/2019  4:58 PM        Passed - Blood pressure under 140/90 in past 12 months     BP Readings from Last 3 Encounters:   12/12/18 116/78   08/15/18 118/74   10/24/17 148/80                 Passed - Recent (12 mo) or future (30 days) visit within the authorizing provider's specialty     Patient had office visit in the last 12 months or has a visit in the next 30 days with authorizing provider or within the authorizing provider's specialty.  See \"Patient Info\" tab in inbasket, or \"Choose Columns\" in Meds & Orders section of the refill encounter.              Passed - Patient has mammogram in past 2 years on file if age 50-75        Passed - Medication is active on med list        Passed - Patient is 18 years of age or older        Passed - No active pregnancy on record        Passed - No positive pregnancy test on record in past 12 months        Last Written Prescription Date:  12/13/18  Last Fill Quantity: 24,  # refills: 3   Last office visit: 12/12/2018 with prescribing provider:  Dr Johanna Waldron   Future Office Visit:      NOTE FROM PHARMACY: DRUG NOT COVERED BY PATIENT PLAN. THE PREFERRED ALTERNATIVE IS ESTRADIOL DIS MG.    "

## 2019-05-29 NOTE — TELEPHONE ENCOUNTER
Notes from 8/25/17:  Michelle Simpson MA     8/25/17 8:46 AM   Note      rec'd rejection from pharmacy for Vivelle Dot 0.0375mg CAMILLE as it is nonformulary. Per clinic notes Dr. Waldron made patient aware that brand will not be covered by insurance. No PA being done at this time.     [phone]  ID# [id number]        Office visit notes 10/19/16, pt aware brand name not covered and prefers brand name.  PLAN:  Return one years  Refilled her hormone therapy  She prefers brand name, aware it won't be covered by insurance   Johanna Waldron MD    Called pharmacy and pt has not filled Rx since 10/2018 and it was covered by insurance. No records if pt has paid out of pocket in past.    Called pt and LMTCB to clarify on non Formulary CAMILLE Vivelle-Dot.

## 2019-06-06 RX ORDER — ESTRADIOL 0.04 MG/D
1 PATCH, EXTENDED RELEASE TRANSDERMAL
Qty: 24 PATCH | Refills: 3 | Status: CANCELLED | OUTPATIENT
Start: 2019-06-06

## 2019-06-06 NOTE — TELEPHONE ENCOUNTER
Called and spoke with pt regarding Vivelle-Dot  Patient confirmed insurance no longer covers due to her age. She pays out of pocket for this Rx and has enough refills until her annual due in July or August.    No need for refills at this time.

## 2019-07-23 ENCOUNTER — TELEPHONE (OUTPATIENT)
Dept: OBGYN | Facility: CLINIC | Age: 74
End: 2019-07-23

## 2019-07-23 NOTE — TELEPHONE ENCOUNTER
rec'd rejection for vivelle dot patch. Patient pays cash for this medication per refill note 5/28/19.

## 2019-08-12 ENCOUNTER — TRANSFERRED RECORDS (OUTPATIENT)
Dept: HEALTH INFORMATION MANAGEMENT | Facility: CLINIC | Age: 74
End: 2019-08-12

## 2019-09-30 ENCOUNTER — HEALTH MAINTENANCE LETTER (OUTPATIENT)
Age: 74
End: 2019-09-30

## 2019-10-28 ENCOUNTER — TRANSFERRED RECORDS (OUTPATIENT)
Dept: HEALTH INFORMATION MANAGEMENT | Facility: CLINIC | Age: 74
End: 2019-10-28

## 2019-12-31 DIAGNOSIS — Z79.890 HORMONE REPLACEMENT THERAPY: ICD-10-CM

## 2019-12-31 RX ORDER — ESTRADIOL 0.04 MG/D
PATCH, EXTENDED RELEASE TRANSDERMAL
Qty: 24 PATCH | Refills: 0 | Status: SHIPPED | OUTPATIENT
Start: 2019-12-31 | End: 2020-06-26

## 2019-12-31 NOTE — TELEPHONE ENCOUNTER
"Requested Prescriptions   Pending Prescriptions Disp Refills     VIVELLE-DOT 0.0375 MG/24HR BIW patch [Pharmacy Med Name: VIVELLE-DOT 0.0375MG PTCH(TWICE WK)] 24 patch 3     Sig: PLACE 1 PATCH ONTO THE SKIN TWICE A WEEK       Hormone Replacement Therapy Failed - 12/31/2019 11:01 AM        Failed - Blood pressure under 140/90 in past 12 months     BP Readings from Last 3 Encounters:   12/12/18 116/78   08/15/18 118/74   10/24/17 148/80                 Failed - Recent (12 mo) or future (30 days) visit within the authorizing provider's specialty     Patient has had an office visit with the authorizing provider or a provider within the authorizing providers department within the previous 12 mos or has a future within next 30 days. See \"Patient Info\" tab in inbasket, or \"Choose Columns\" in Meds & Orders section of the refill encounter.              Passed - Patient has mammogram in past 2 years on file if age 50-75        Passed - Medication is active on med list        Passed - Patient is 18 years of age or older        Passed - No active pregnancy on record        Passed - No positive pregnancy test on record in past 12 months        Medication is being filled for 1 time refill only due to:  appointment needed for further refiills   Lelo Mora RN on 12/31/2019 at 11:10 AM      "

## 2020-03-02 DIAGNOSIS — Z79.890 HORMONE REPLACEMENT THERAPY: ICD-10-CM

## 2020-03-02 RX ORDER — ESTRADIOL 0.04 MG/D
PATCH TRANSDERMAL
OUTPATIENT
Start: 2020-03-02

## 2020-03-02 NOTE — TELEPHONE ENCOUNTER
"Requested Prescriptions   Pending Prescriptions Disp Refills     estradiol (CLIMARA) 0.0375 MG/24HR weekly patch         Hormone Replacement Therapy Failed - 3/2/2020 11:39 AM        Failed - Blood pressure under 140/90 in past 12 months     BP Readings from Last 3 Encounters:   12/12/18 116/78   08/15/18 118/74   10/24/17 148/80                 Failed - Recent (12 mo) or future (30 days) visit within the authorizing provider's specialty     Patient has had an office visit with the authorizing provider or a provider within the authorizing providers department within the previous 12 mos or has a future within next 30 days. See \"Patient Info\" tab in inbasket, or \"Choose Columns\" in Meds & Orders section of the refill encounter.              Passed - Patient has mammogram in past 2 years on file if age 50-75        Passed - Medication is active on med list        Passed - Patient is 18 years of age or older        Passed - No active pregnancy on record        Passed - No positive pregnancy test on record in past 12 months        Last Written Prescription Date:  12/31/2019  Last Fill Quantity: 24,  # refills: 0   Last office visit: 12/12/2018 with prescribing provider:  Dr. Waldron   Future Office Visit:      "

## 2020-03-02 NOTE — TELEPHONE ENCOUNTER
Pt due for annual, no appt scheduled. Pt already received one month extension. Rx denied.       Mandy Sifuentes RN on 3/2/2020 at 11:44 AM

## 2020-03-11 ENCOUNTER — TELEPHONE (OUTPATIENT)
Dept: OBGYN | Facility: CLINIC | Age: 75
End: 2020-03-11

## 2020-03-11 NOTE — TELEPHONE ENCOUNTER
Fax received from [employer] regarding VIVELLE-DOT 0.0375 MG/24HR BIW patch is not covered on formulary. Patient received a 30 day temporary supply, date filled 2/26/20. Chart reviewed (Refill Enc 5/28/19, Tel Enc 7/23/19) and it was noted patient aware of insurance issues and pays cash.

## 2020-03-15 ENCOUNTER — HEALTH MAINTENANCE LETTER (OUTPATIENT)
Age: 75
End: 2020-03-15

## 2020-06-02 DIAGNOSIS — Z79.890 HORMONE REPLACEMENT THERAPY: ICD-10-CM

## 2020-06-02 RX ORDER — ESTRADIOL 0.04 MG/D
PATCH, EXTENDED RELEASE TRANSDERMAL
Qty: 24 PATCH | Refills: 0 | OUTPATIENT
Start: 2020-06-02

## 2020-06-02 NOTE — TELEPHONE ENCOUNTER
"Requested Prescriptions   Pending Prescriptions Disp Refills     VIVELLE-DOT 0.0375 MG/24HR BIW patch [Pharmacy Med Name: VIVELLE-DOT 0.0375MG PTCH(TWICE WK)] 24 patch 0     Sig: APPLY 1 PATCH EXTERNALLY TO THE SKIN 2 TIMES A WEEK       Hormone Replacement Therapy Failed - 6/2/2020  9:15 AM        Failed - Blood pressure under 140/90 in past 12 months     BP Readings from Last 3 Encounters:   12/12/18 116/78   08/15/18 118/74   10/24/17 148/80                 Failed - Recent (12 mo) or future (30 days) visit within the authorizing provider's specialty     Patient has had an office visit with the authorizing provider or a provider within the authorizing providers department within the previous 12 mos or has a future within next 30 days. See \"Patient Info\" tab in inbasket, or \"Choose Columns\" in Meds & Orders section of the refill encounter.              Passed - Patient has mammogram in past 2 years on file if age 50-75        Passed - Medication is active on med list        Passed - Patient is 18 years of age or older        Passed - No active pregnancy on record        Passed - No positive pregnancy test on record in past 12 months           Pt due for annual, no appt scheduled. Pt already received one month extension. Rx denied.   Lelo Mora RN on 6/2/2020 at 9:29 AM    "

## 2020-06-19 DIAGNOSIS — Z79.890 HORMONE REPLACEMENT THERAPY: ICD-10-CM

## 2020-06-19 RX ORDER — ESTRADIOL 0.04 MG/D
PATCH, EXTENDED RELEASE TRANSDERMAL
Qty: 24 PATCH | Refills: 0 | OUTPATIENT
Start: 2020-06-19

## 2020-06-19 NOTE — TELEPHONE ENCOUNTER
"Requested Prescriptions   Pending Prescriptions Disp Refills     VIVELLE-DOT 0.0375 MG/24HR BIW patch [Pharmacy Med Name: VIVELLE-DOT 0.0375MG PTCH(TWICE WK)] 24 patch 0     Sig: APPLY 1 PATCH EXTERNALLY TO THE SKIN 2 TIMES A WEEK       Hormone Replacement Therapy Failed - 6/19/2020  4:01 PM        Failed - Blood pressure under 140/90 in past 12 months     BP Readings from Last 3 Encounters:   12/12/18 116/78   08/15/18 118/74   10/24/17 148/80                 Failed - Recent (12 mo) or future (30 days) visit within the authorizing provider's specialty     Patient has had an office visit with the authorizing provider or a provider within the authorizing providers department within the previous 12 mos or has a future within next 30 days. See \"Patient Info\" tab in inbasket, or \"Choose Columns\" in Meds & Orders section of the refill encounter.              Passed - Patient has mammogram in past 2 years on file if age 50-75        Passed - Medication is active on med list        Passed - Patient is 18 years of age or older        Passed - No active pregnancy on record        Passed - No positive pregnancy test on record in past 12 months           Last Written Prescription Date:  12/31/19  Last Fill Quantity: 24,  # refills: 0   Last office visit: 12/12/2018 with prescribing provider:  Dr. Waldron     Future Office Visit:  none  Pt due for annual, no appt scheduled. Pt already received one month extension. Rx denied.   Lelo Mora RN on 6/19/2020 at 4:04 PM          "

## 2020-06-26 ENCOUNTER — TELEPHONE (OUTPATIENT)
Dept: OBGYN | Facility: CLINIC | Age: 75
End: 2020-06-26

## 2020-06-26 DIAGNOSIS — Z79.890 HORMONE REPLACEMENT THERAPY: ICD-10-CM

## 2020-06-26 RX ORDER — ESTRADIOL 0.04 MG/D
PATCH, EXTENDED RELEASE TRANSDERMAL
Qty: 24 PATCH | Refills: 0 | Status: SHIPPED | OUTPATIENT
Start: 2020-06-26 | End: 2020-08-18

## 2020-06-26 NOTE — TELEPHONE ENCOUNTER
Next 5 appointments (look out 90 days)    Jul 21, 2020  2:00 PM CDT  PHYSICAL with Johanna Waldron MD  [facility] [address] [place] [id number]  [phone]        Refill sent  Lelo Mora RN on 6/26/2020 at 9:28 AM

## 2020-08-17 ENCOUNTER — TRANSFERRED RECORDS (OUTPATIENT)
Dept: HEALTH INFORMATION MANAGEMENT | Facility: CLINIC | Age: 75
End: 2020-08-17

## 2020-08-18 ENCOUNTER — OFFICE VISIT (OUTPATIENT)
Dept: OBGYN | Facility: CLINIC | Age: 75
End: 2020-08-18
Payer: COMMERCIAL

## 2020-08-18 VITALS
DIASTOLIC BLOOD PRESSURE: 72 MMHG | SYSTOLIC BLOOD PRESSURE: 138 MMHG | HEIGHT: 64 IN | WEIGHT: 142.2 LBS | HEART RATE: 80 BPM | BODY MASS INDEX: 24.28 KG/M2

## 2020-08-18 DIAGNOSIS — Z12.12 SCREENING FOR COLORECTAL CANCER: ICD-10-CM

## 2020-08-18 DIAGNOSIS — A60.00 GENITAL HERPES SIMPLEX, UNSPECIFIED SITE: ICD-10-CM

## 2020-08-18 DIAGNOSIS — Z12.11 SCREENING FOR COLORECTAL CANCER: ICD-10-CM

## 2020-08-18 DIAGNOSIS — Z01.419 ENCOUNTER FOR GYNECOLOGICAL EXAMINATION WITHOUT ABNORMAL FINDING: Primary | ICD-10-CM

## 2020-08-18 DIAGNOSIS — Z79.890 HORMONE REPLACEMENT THERAPY: ICD-10-CM

## 2020-08-18 PROCEDURE — 99397 PER PM REEVAL EST PAT 65+ YR: CPT | Performed by: OBSTETRICS & GYNECOLOGY

## 2020-08-18 RX ORDER — OMEPRAZOLE 40 MG/1
40 CAPSULE, DELAYED RELEASE ORAL
COMMUNITY
End: 2023-06-26

## 2020-08-18 RX ORDER — ESTRADIOL 0.04 MG/D
PATCH, EXTENDED RELEASE TRANSDERMAL
Qty: 24 PATCH | Refills: 3 | Status: SHIPPED | OUTPATIENT
Start: 2020-08-18 | End: 2021-09-13

## 2020-08-18 RX ORDER — VALACYCLOVIR HYDROCHLORIDE 1 G/1
1000 TABLET, FILM COATED ORAL DAILY
Qty: 90 TABLET | Refills: 3 | Status: SHIPPED | OUTPATIENT
Start: 2020-08-18 | End: 2022-03-02

## 2020-08-18 RX ORDER — ROSUVASTATIN CALCIUM 5 MG/1
2.5 TABLET, COATED ORAL
COMMUNITY
Start: 2020-01-07

## 2020-08-18 ASSESSMENT — ANXIETY QUESTIONNAIRES
7. FEELING AFRAID AS IF SOMETHING AWFUL MIGHT HAPPEN: NOT AT ALL
1. FEELING NERVOUS, ANXIOUS, OR ON EDGE: NOT AT ALL
GAD7 TOTAL SCORE: 0
5. BEING SO RESTLESS THAT IT IS HARD TO SIT STILL: NOT AT ALL
IF YOU CHECKED OFF ANY PROBLEMS ON THIS QUESTIONNAIRE, HOW DIFFICULT HAVE THESE PROBLEMS MADE IT FOR YOU TO DO YOUR WORK, TAKE CARE OF THINGS AT HOME, OR GET ALONG WITH OTHER PEOPLE: NOT DIFFICULT AT ALL
2. NOT BEING ABLE TO STOP OR CONTROL WORRYING: NOT AT ALL
3. WORRYING TOO MUCH ABOUT DIFFERENT THINGS: NOT AT ALL
6. BECOMING EASILY ANNOYED OR IRRITABLE: NOT AT ALL

## 2020-08-18 ASSESSMENT — MIFFLIN-ST. JEOR: SCORE: 1143.98

## 2020-08-18 ASSESSMENT — PATIENT HEALTH QUESTIONNAIRE - PHQ9
5. POOR APPETITE OR OVEREATING: NOT AT ALL
SUM OF ALL RESPONSES TO PHQ QUESTIONS 1-9: 0

## 2020-08-18 NOTE — PROGRESS NOTES
Cheyenne is a 74 year old  female who presents for Medicare Limited exam.       HPI :    Patient is doing well  Prior EDEN  Has primary doctor  Needs refill on her hormone replacement therapy  And valtrex  Had mammogram already  Doesn't have a cervix  Is on vivelle dot 0.0375    Former smoker in distant past  Has breast implants     still working  No concerns        GYNECOLOGIC HISTORY:  No LMP recorded. Patient has had a hysterectomy..   reports that she has quit smoking. Her smoking use included cigarettes. She has a 8.00 pack-year smoking history. She quit smokeless tobacco use about 38 years ago.    STD testing offered?  Declined  Last PHQ-9 score on record=   PHQ-9 SCORE 2020   PHQ-9 Total Score 0     Last GAD7 score on record=   DIDIER-7 SCORE 10/19/2016 10/24/2017 2020   Total Score 0 0 0       HEALTH MAINTENANCE:  Cholesterol:   Recent Labs   Lab Test 18  0830 10/24/17  0932   CHOL 247* 236*   HDL 87 91   * 132*   TRIG 53 66     Last Mammo: One year ago, Result: Normal, Next Mammo: Had elsewhere   Pap: No longer needed  DEXA:  2015  Colonoscopy:  2016, Result:  Abnormal, Next Colonoscopy: Due.    HISTORY:  OB History    Para Term  AB Living   2 2 2 0 0 2   SAB TAB Ectopic Multiple Live Births   0 0 0 0 0      # Outcome Date GA Lbr Kike/2nd Weight Sex Delivery Anes PTL Lv   2 Term            1 Term              Past Medical History:   Diagnosis Date     Benign neoplasm of parathyroid gland      Diverticulitis of colon      GERD (gastroesophageal reflux disease) 2015     Hyperlipidemia      Irritable bowel syndrome      Osteopenia     followed by rheumatolgoy, declined medical therapy     Past Surgical History:   Procedure Laterality Date     ABDOMINOPLASTY       C BREAST AUGMENTATION       HYSTERECTOMY, EDEN  1998     PARATHYROIDECTOMY       History reviewed. No pertinent family history.  Social History     Socioeconomic History      Marital status:      Spouse name: Not on file     Number of children: 2     Years of education: Not on file     Highest education level: Not on file   Occupational History     Not on file   Social Needs     Financial resource strain: Not on file     Food insecurity     Worry: Not on file     Inability: Not on file     Transportation needs     Medical: Not on file     Non-medical: Not on file   Tobacco Use     Smoking status: Former Smoker     Packs/day: 1.00     Years: 8.00     Pack years: 8.00     Types: Cigarettes     Smokeless tobacco: Former User     Quit date: 9/13/1981   Substance and Sexual Activity     Alcohol use: Yes     Comment: very light     Drug use: No     Sexual activity: Yes     Partners: Male     Birth control/protection: Female Surgical     Comment: [facility]   Lifestyle     Physical activity     Days per week: Not on file     Minutes per session: Not on file     Stress: Not on file   Relationships     Social connections     Talks on phone: Not on file     Gets together: Not on file     Attends [employer] service: Not on file     Active member of club or organization: Not on file     Attends meetings of clubs or organizations: Not on file     Relationship status: Not on file     Intimate partner violence     Fear of current or ex partner: Not on file     Emotionally abused: Not on file     Physically abused: Not on file     Forced sexual activity: Not on file   Other Topics Concern     Parent/sibling w/ CABG, MI or angioplasty before 65F 55M? No   Social History Narrative     Not on file     Current Outpatient Medications   Medication Sig     Calcium Carbonate-Vitamin D (CALCIUM + D) 600-200 MG-UNIT per tablet Take 1 tablet by mouth 2 times daily.     Lactobacillus (PROBIOTIC ACIDOPHILUS PO)      Multiple Vitamins-Minerals (MULTIVITAL) TABS Take 1 tablet by mouth daily.     omeprazole (PRILOSEC) 40 MG DR capsule Take 40 mg by mouth     RaNITidine HCl (ZANTAC 75 PO)      rosuvastatin (CRESTOR) 5 MG  "tablet Take 2.5 mg by mouth     VIVELLE-DOT 0.0375 MG/24HR BIW patch PLACE 1 PATCH ONTO THE SKIN TWICE A WEEK (Patient taking differently: PLACE 0.5 PATCH ONTO THE SKIN TWICE A WEEK)     cycloSPORINE (RESTASIS) 0.05 % ophthalmic emulsion 1 drop as needed.     valACYclovir (VALTREX) 1000 mg tablet Take 1 tablet (1,000 mg) by mouth daily (Patient not taking: Reported on 8/18/2020)     No current facility-administered medications for this visit.      Allergies   Allergen Reactions     Flagyl [Metronidazole Hcl]      Thrush and gerd     Macrobid [Nitrofurantoin]        Past medical, surgical, social and family history were reviewed and updated in [facility].    EXAM:  /72   Pulse 80   Ht 1.632 m (5' 4.25\")   Wt 64.5 kg (142 lb 3.2 oz)   Breastfeeding No   BMI 24.22 kg/m     BMI: Body mass index is 24.22 kg/m .    Constitutional: Appearance: Well nourished, well developed alert, in no acute distress  Breasts: Inspection of Breasts:  No lymphadenopathy present    Palpation of Breasts and Axillae:  No masses present on palpation, no  breast tenderness    Axillary Lymph Nodes:  No lymphadenopathy present  Neurologic/Psychiatric:    Mental Status:  Oriented X3     Pelvic Exam:  External Genitalia:     Normal appearance for age, no discharge present, no tenderness present, no inflammatory lesions present, color normal  Vagina:     Normal vaginal vault without central or paravaginal defects, no discharge present, no inflammatory lesions present, no masses present  Bladder:     Nontender to palpation  Urethra:   Urethral Body:  Urethra palpation normal, urethra structural support normal   Urethral Meatus:  No erythema or lesions present  Cervix:     Surgically absent  Uterus:     Surgically absent  Adnexa:     No adnexal tenderness present, no adnexal masses present  Perineum:     Perineum within normal limits, no evidence of trauma, no rashes or skin lesions present  Anus:     Anus within normal limits, no hemorrhoids " present  Inguinal Lymph Nodes:     No lymphadenopathy present  Pubic Hair:     Normal pubic hair distribution for age  Genitalia and Groin:     No rashes present, no lesions present, no areas of discoloration, no masses present      Body mass index is 24.22 kg/m .     reports that she has quit smoking. Her smoking use included cigarettes. She has a 8.00 pack-year smoking history. She quit smokeless tobacco use about 38 years ago.      ASSESSMENT:  74 year old female with satisfactory annual exam.    ICD-10-CM    1. Encounter for gynecological examination without abnormal finding  Z01.419 Medicare Limited Visit   2. Screening for colorectal cancer  Z12.11 GASTROENTEROLOGY ADULT REF PROCEDURE ONLY    Z12.12    3. Hormone replacement therapy  Z79.890 VIVELLE-DOT 0.0375 MG/24HR BIW patch   4. Genital herpes simplex, unspecified site  A60.00 valACYclovir (VALTREX) 1000 mg tablet       COUNSELING:   Reviewed preventive health counseling, as reflected in patient instructions       Regular exercise       Healthy diet/nutrition    PLAN/PATIENT INSTRUCTIONS:  Discussed covid instructions  Refill estrogen patch  Mammogram  Refill valtrex  rec flu shot this years      There are no Patient Instructions on file for this visit.    Johanna Waldron MD

## 2020-08-19 ASSESSMENT — ANXIETY QUESTIONNAIRES: GAD7 TOTAL SCORE: 0

## 2020-08-24 ENCOUNTER — TELEPHONE (OUTPATIENT)
Dept: OBGYN | Facility: CLINIC | Age: 75
End: 2020-08-24

## 2020-08-24 NOTE — TELEPHONE ENCOUNTER
Fax received from pharmacy requesting Prior Authorization needed for VIVELLE-DOT 0.0375 MG/24HR BIW patch CAMILLE. Chart reviewed and has been noted that patient prefers BRAND name and pays cash. No PA will be initiated based on this information.

## 2021-01-15 ENCOUNTER — HEALTH MAINTENANCE LETTER (OUTPATIENT)
Age: 76
End: 2021-01-15

## 2021-03-08 ENCOUNTER — EST. PATIENT EMERGENCY (OUTPATIENT)
Dept: URBAN - METROPOLITAN AREA CLINIC 36 | Facility: CLINIC | Age: 76
End: 2021-03-08

## 2021-03-08 DIAGNOSIS — H35.3221: ICD-10-CM

## 2021-03-08 DIAGNOSIS — H35.3112: ICD-10-CM

## 2021-03-08 PROCEDURE — 92134 CPTRZ OPH DX IMG PST SGM RTA: CPT

## 2021-03-08 PROCEDURE — 92014 COMPRE OPH EXAM EST PT 1/>: CPT

## 2021-03-08 ASSESSMENT — VISUAL ACUITY
OD_CC: 20/40+2
OS_PH: 20/200
OS_CC: 20/400

## 2021-03-08 ASSESSMENT — TONOMETRY
OD_IOP_MMHG: 14
OS_IOP_MMHG: 14

## 2021-03-10 ENCOUNTER — RETINA CONSULT (OUTPATIENT)
Dept: URBAN - METROPOLITAN AREA CLINIC 43 | Facility: CLINIC | Age: 76
End: 2021-03-10

## 2021-03-10 DIAGNOSIS — H35.3112: ICD-10-CM

## 2021-03-10 DIAGNOSIS — H35.3221: ICD-10-CM

## 2021-03-10 DIAGNOSIS — H35.30: ICD-10-CM

## 2021-03-10 DIAGNOSIS — H43.813: ICD-10-CM

## 2021-03-10 DIAGNOSIS — H35.363: ICD-10-CM

## 2021-03-10 PROCEDURE — 67028 INJECTION EYE DRUG: CPT

## 2021-03-10 PROCEDURE — 99214 OFFICE O/P EST MOD 30 MIN: CPT

## 2021-03-10 PROCEDURE — 92250 FUNDUS PHOTOGRAPHY W/I&R: CPT

## 2021-03-10 ASSESSMENT — VISUAL ACUITY
OS_CC: 20/400
OD_CC: 20/40+2
OS_PH: 20/200

## 2021-03-10 ASSESSMENT — TONOMETRY
OS_IOP_MMHG: 15
OD_IOP_MMHG: 17

## 2021-04-07 ENCOUNTER — ESTABLISHED PATIENT (OUTPATIENT)
Dept: URBAN - METROPOLITAN AREA CLINIC 43 | Facility: CLINIC | Age: 76
End: 2021-04-07

## 2021-04-07 DIAGNOSIS — H35.3221: ICD-10-CM

## 2021-04-07 DIAGNOSIS — H35.3112: ICD-10-CM

## 2021-04-07 DIAGNOSIS — H43.813: ICD-10-CM

## 2021-04-07 DIAGNOSIS — H35.363: ICD-10-CM

## 2021-04-07 PROCEDURE — 92134 CPTRZ OPH DX IMG PST SGM RTA: CPT

## 2021-04-07 PROCEDURE — 67028 INJECTION EYE DRUG: CPT

## 2021-04-07 PROCEDURE — 99213 OFFICE O/P EST LOW 20 MIN: CPT

## 2021-04-07 PROCEDURE — 92202 OPSCPY EXTND ON/MAC DRAW: CPT

## 2021-04-07 PROCEDURE — J3490AVA AVASTIN

## 2021-04-07 ASSESSMENT — TONOMETRY
OS_IOP_MMHG: 12
OD_IOP_MMHG: 13

## 2021-04-07 ASSESSMENT — VISUAL ACUITY
OS_SC: 20/50-1
OD_SC: 20/40-1

## 2021-05-09 ENCOUNTER — HEALTH MAINTENANCE LETTER (OUTPATIENT)
Age: 76
End: 2021-05-09

## 2021-06-09 ENCOUNTER — TELEPHONE (OUTPATIENT)
Dept: OBGYN | Facility: CLINIC | Age: 76
End: 2021-06-09

## 2021-06-09 NOTE — TELEPHONE ENCOUNTER
75y.o.    Was seen 3 weeks ago at [facility]. Urine was negative for infection at that time. Patient states has had ongoing symptoms that have worsened. Has had frequency of urination. Feels as though has to void every 5minutes. Overnight wakes up 6times to use restroom. Normally wakes up 1-2times a night. Pt  Unsure if related to overactive bladder or actual infection. Denies fever. Currently using AZO. Unsure if helping. Denies pain with urination. Does have slight lower pelvic cramping.   Discussed should be seen sooner. Transferred to scheduling to make an appointment with another provider or to see if sooner appt.    Catina Witt RN

## 2021-06-14 NOTE — PROGRESS NOTES
SUBJECTIVE:                                                   Cheyenne Mckeon is a 75 year old female who presents to clinic today for the following health issue(s):  Patient presents with:  Urinary Problem      Additional information: urine frequency for the last 3-4 weeks    HPI:  1 month urge and freq but no dysuria or hematuria  Non smoker  No incontinence  Nocturia several times a night    Prior hysterectomy, no cervix left        No LMP recorded. Patient has had a hysterectomy..     Patient is sexually active, .  Using hysterectomy for contraception.    reports that she has quit smoking. Her smoking use included cigarettes. She has a 8.00 pack-year smoking history. She quit smokeless tobacco use about 39 years ago.    STD testing offered?  Declined    Health maintenance updated:  yes    Today's PHQ-2 Score:   PHQ-2 (  [employer]) 10/28/2015   Q1: Little interest or pleasure in doing things 0   Q2: Feeling down, depressed or hopeless 0   PHQ-2 Score 0     Today's PHQ-9 Score:   PHQ-9 SCORE 2020   PHQ-9 Total Score 0     Today's DIDIER-7 Score:   DIDIER-7 SCORE 2020   Total Score 0       Problem list and histories reviewed & adjusted, as indicated.  Additional history: as documented.    Patient Active Problem List   Diagnosis     IBS (irritable bowel syndrome)     Diverticulitis of colon     OAB (overactive bladder)     Past Surgical History:   Procedure Laterality Date     ABDOMINOPLASTY       C BREAST AUGMENTATION       HYSTERECTOMY, EDEN       PARATHYROIDECTOMY        Social History     Tobacco Use     Smoking status: Former Smoker     Packs/day: 1.00     Years: 8.00     Pack years: 8.00     Types: Cigarettes     Smokeless tobacco: Former User     Quit date: 1981   Substance Use Topics     Alcohol use: Yes     Comment: very light           Current Outpatient Medications   Medication Sig     Calcium Carbonate-Vitamin D (CALCIUM + D) 600-200 MG-UNIT per tablet Take 1 tablet by mouth  "2 times daily.     cycloSPORINE (RESTASIS) 0.05 % ophthalmic emulsion 1 drop as needed.     Lactobacillus (PROBIOTIC ACIDOPHILUS PO)      mirabegron (MYRBETRIQ) 25 MG 24 hr tablet Take 1 tablet (25 mg) by mouth daily     Multiple Vitamins-Minerals (MULTIVITAL) TABS Take 1 tablet by mouth daily.     omeprazole (PRILOSEC) 40 MG DR capsule Take 40 mg by mouth     rosuvastatin (CRESTOR) 5 MG tablet Take 2.5 mg by mouth     valACYclovir (VALTREX) 1000 mg tablet Take 1 tablet (1,000 mg) by mouth daily     VIVELLE-DOT 0.0375 MG/24HR BIW patch PLACE 1 PATCH ONTO THE SKIN TWICE A WEEK     No current facility-administered medications for this visit.      Allergies   Allergen Reactions     Flagyl [Metronidazole Hcl]      Thrush and gerd     Macrobid [Nitrofurantoin]        ROS:  12 point review of systems negative other than symptoms noted below or in the HPI.        OBJECTIVE:     /84   Ht 1.632 m (5' 4.25\")   Wt 64.2 kg (141 lb 9.6 oz)   Breastfeeding No   BMI 24.12 kg/m    Body mass index is 24.12 kg/m .    Exam:  Constitutional:  Appearance: Well nourished, well developed alert, in no acute distress  Skin: General Inspection:  No rashes present, no lesions present, no areas of discoloration.  Neurologic:  Mental Status:  Oriented X3.  Normal strength and tone, sensory exam grossly normal, mentation intact and speech normal.    Psychiatric:  Mentation appears normal and affect normal/bright.  Pelvic Exam:  External Genitalia:     Normal appearance for age, no discharge present, no tenderness present, no inflammatory lesions present, color normal  Vagina:     Normal vaginal vault without central or paravaginal defects, no discharge present, no inflammatory lesions present, no masses present    + atrophy  Bladder:     Nontender to palpation  Urethra:   Urethral Body:  Urethra palpation normal, urethra structural support normal   Urethral Meatus:  No erythema or lesions present  Cervix:     Surgically absent  Uterus: "     Surgically absent  Adnexa:     No adnexal tenderness present, no adnexal masses present  Perineum:     Perineum within normal limits, no evidence of trauma, no rashes or skin lesions present  Anus:     Anus within normal limits, no hemorrhoids present  Inguinal Lymph Nodes:     No lymphadenopathy present  Pubic Hair:     Normal pubic hair distribution for age  Genitalia and Groin:     No rashes present, no lesions present, no areas of discoloration, no masses present       In-Clinic Test Results:  Results for orders placed or performed in visit on 06/15/21 (from the past 24 hour(s))   *UA reflex to Microscopic and Culture ([facility] (except [place] and [place])    Specimen: Midstream Urine   Result Value Ref Range    Color Urine Yellow     Appearance Urine Clear     Glucose Urine Negative NEG^Negative mg/dL    Bilirubin Urine Negative NEG^Negative    Ketones Urine Negative NEG^Negative mg/dL    Specific Gravity Urine 1.020 1.003 - 1.035    Blood Urine Negative NEG^Negative    pH Urine 5.5 5.0 - 7.0 pH    Protein Albumin Urine Negative NEG^Negative mg/dL    Urobilinogen Urine 0.2 0.2 - 1.0 EU/dL    Nitrite Urine Negative NEG^Negative    Leukocyte Esterase Urine Negative NEG^Negative    Source Midstream Urine        ASSESSMENT/PLAN:                                                        ICD-10-CM    1. Urine frequency  R35.0 *UA reflex to Microscopic and Culture ([facility] (except [place] and [place])     Urine Culture Aerobic Bacterial   2. OAB (overactive bladder)  N32.81 mirabegron (MYRBETRIQ) 25 MG 24 hr tablet         Coffee, wine occ but not a lot  Discussed diet changes    Repeat ua negative, will send  uc anyway  Try elimnating acids in diet and limit fluids in evening  kegels- she is trying to see if they help  Symptoms sort of abrupt in onset but seem like oab typical    New prescription for myrbetriq sent out for her to try  Discussed possible side  effects    Usually diet changes are first step          Johanna Waldron MD  [facility] [place]

## 2021-06-15 ENCOUNTER — OFFICE VISIT (OUTPATIENT)
Dept: OBGYN | Facility: CLINIC | Age: 76
End: 2021-06-15
Payer: COMMERCIAL

## 2021-06-15 VITALS
WEIGHT: 141.6 LBS | SYSTOLIC BLOOD PRESSURE: 124 MMHG | DIASTOLIC BLOOD PRESSURE: 84 MMHG | BODY MASS INDEX: 24.17 KG/M2 | HEIGHT: 64 IN

## 2021-06-15 DIAGNOSIS — R35.0 URINE FREQUENCY: Primary | ICD-10-CM

## 2021-06-15 DIAGNOSIS — N32.81 OAB (OVERACTIVE BLADDER): ICD-10-CM

## 2021-06-15 LAB
ALBUMIN UR-MCNC: NEGATIVE MG/DL
APPEARANCE UR: CLEAR
BILIRUB UR QL STRIP: NEGATIVE
COLOR UR AUTO: YELLOW
GLUCOSE UR STRIP-MCNC: NEGATIVE MG/DL
HGB UR QL STRIP: NEGATIVE
KETONES UR STRIP-MCNC: NEGATIVE MG/DL
LEUKOCYTE ESTERASE UR QL STRIP: NEGATIVE
NITRATE UR QL: NEGATIVE
PH UR STRIP: 5.5 PH (ref 5–7)
SOURCE: NORMAL
SP GR UR STRIP: 1.02 (ref 1–1.03)
UROBILINOGEN UR STRIP-ACNC: 0.2 EU/DL (ref 0.2–1)

## 2021-06-15 PROCEDURE — 99213 OFFICE O/P EST LOW 20 MIN: CPT | Performed by: OBSTETRICS & GYNECOLOGY

## 2021-06-15 PROCEDURE — 87086 URINE CULTURE/COLONY COUNT: CPT | Performed by: OBSTETRICS & GYNECOLOGY

## 2021-06-15 PROCEDURE — 81003 URINALYSIS AUTO W/O SCOPE: CPT | Performed by: OBSTETRICS & GYNECOLOGY

## 2021-06-15 RX ORDER — MIRABEGRON 25 MG/1
25 TABLET, FILM COATED, EXTENDED RELEASE ORAL DAILY
Qty: 30 TABLET | Refills: 4 | Status: SHIPPED | OUTPATIENT
Start: 2021-06-15 | End: 2024-08-21

## 2021-06-15 ASSESSMENT — MIFFLIN-ST. JEOR: SCORE: 1136.26

## 2021-06-16 LAB
BACTERIA SPEC CULT: NO GROWTH
Lab: NORMAL
SPECIMEN SOURCE: NORMAL

## 2021-06-30 ENCOUNTER — TRANSFERRED RECORDS (OUTPATIENT)
Dept: HEALTH INFORMATION MANAGEMENT | Facility: CLINIC | Age: 76
End: 2021-06-30

## 2021-08-11 ENCOUNTER — TRANSFERRED RECORDS (OUTPATIENT)
Dept: HEALTH INFORMATION MANAGEMENT | Facility: CLINIC | Age: 76
End: 2021-08-11

## 2021-08-18 ENCOUNTER — TRANSFERRED RECORDS (OUTPATIENT)
Dept: HEALTH INFORMATION MANAGEMENT | Facility: CLINIC | Age: 76
End: 2021-08-18

## 2021-09-02 ENCOUNTER — TRANSFERRED RECORDS (OUTPATIENT)
Dept: HEALTH INFORMATION MANAGEMENT | Facility: CLINIC | Age: 76
End: 2021-09-02

## 2021-09-12 DIAGNOSIS — Z79.890 HORMONE REPLACEMENT THERAPY: ICD-10-CM

## 2021-09-13 RX ORDER — ESTRADIOL 0.04 MG/D
PATCH, EXTENDED RELEASE TRANSDERMAL
Qty: 24 PATCH | Refills: 0 | Status: SHIPPED | OUTPATIENT
Start: 2021-09-13 | End: 2022-03-02

## 2021-09-13 NOTE — TELEPHONE ENCOUNTER
"Requested Prescriptions   Pending Prescriptions Disp Refills     VIVELLE-DOT 0.0375 MG/24HR BIW patch [Pharmacy Med Name: VIVELLE-DOT 0.0375MG PTCH(TWICE WK)] 24 patch 3     Sig: APPLY 1 PATCH EXTERNALLY TO THE SKIN 2 TIMES A WEEK       Hormone Replacement Therapy Passed - 9/12/2021 10:56 AM        Passed - Blood pressure under 140/90 in past 12 months     BP Readings from Last 3 Encounters:   06/15/21 124/84   08/18/20 138/72   12/12/18 116/78                 Passed - Recent (12 mo) or future (30 days) visit within the authorizing provider's specialty     Patient has had an office visit with the authorizing provider or a provider within the authorizing providers department within the previous 12 mos or has a future within next 30 days. See \"Patient Info\" tab in inbasket, or \"Choose Columns\" in Meds & Orders section of the refill encounter.              Passed - Patient has mammogram in past 2 years on file if age 50-75        Passed - Medication is active on med list        Passed - Patient is 18 years of age or older        Passed - No active pregnancy on record        Passed - No positive pregnancy test on record in past 12 months           Last Written Prescription Date:  8/18/20  Last Fill Quantity: 24,  # refills: 3   Last office visit: 6/15/2021 with prescribing provider:  Dr sahni   Future Office Visit:  None    Prescription approved per [facility] Refill Protocol.  Annual exam needed for further refills  Lelo Mora RN on 9/13/2021 at 10:30 AM        "

## 2021-09-15 ENCOUNTER — TELEPHONE (OUTPATIENT)
Dept: OBGYN | Facility: CLINIC | Age: 76
End: 2021-09-15

## 2021-09-15 NOTE — TELEPHONE ENCOUNTER
Fax received from pharmacy regarding VIVELLE-DOT 0.0375 MG/24HR BIW patch not covered by insurance plan. Preferred alternative is Estradiol patch. Chart reviewed and patient prefers brand name and pays cash, has been getting this for many years.

## 2021-10-24 ENCOUNTER — HEALTH MAINTENANCE LETTER (OUTPATIENT)
Age: 76
End: 2021-10-24

## 2021-12-08 ENCOUNTER — COMPREHENSIVE EXAM (OUTPATIENT)
Dept: URBAN - METROPOLITAN AREA CLINIC 43 | Facility: CLINIC | Age: 76
End: 2021-12-08

## 2021-12-08 DIAGNOSIS — H35.3221: ICD-10-CM

## 2021-12-08 DIAGNOSIS — H35.363: ICD-10-CM

## 2021-12-08 DIAGNOSIS — H43.813: ICD-10-CM

## 2021-12-08 DIAGNOSIS — H35.3112: ICD-10-CM

## 2021-12-08 PROCEDURE — 99214 OFFICE O/P EST MOD 30 MIN: CPT

## 2021-12-08 PROCEDURE — 67028 INJECTION EYE DRUG: CPT

## 2021-12-08 PROCEDURE — 92250 FUNDUS PHOTOGRAPHY W/I&R: CPT

## 2021-12-08 PROCEDURE — J3490AVA AVASTIN

## 2021-12-08 ASSESSMENT — TONOMETRY
OD_IOP_MMHG: 13
OS_IOP_MMHG: 13

## 2021-12-08 ASSESSMENT — VISUAL ACUITY
OD_SC: 20/40
OS_CC: 20/25+2

## 2022-01-04 ENCOUNTER — TELEPHONE (OUTPATIENT)
Dept: OBGYN | Facility: CLINIC | Age: 77
End: 2022-01-04
Payer: MEDICARE

## 2022-01-04 NOTE — TELEPHONE ENCOUNTER
Drug Change Request received from [id number] [place], [place].  Per chart review, patient pays cash for VIVELLE-DOT 0.0375 MG/24HR BIW patch brand name, so request will be denied.     Telephone message left for patient regarding this. This prescription was refilled for 3 months 9/13/21 #24 patches, 0 refills. Told patient she will need an Annual Exam for further refills. (see Refill Enc 9/12/21)

## 2022-02-02 ENCOUNTER — COMPREHENSIVE EXAM (OUTPATIENT)
Dept: URBAN - METROPOLITAN AREA CLINIC 43 | Facility: CLINIC | Age: 77
End: 2022-02-02

## 2022-02-02 DIAGNOSIS — H35.363: ICD-10-CM

## 2022-02-02 DIAGNOSIS — H43.813: ICD-10-CM

## 2022-02-02 DIAGNOSIS — H35.3112: ICD-10-CM

## 2022-02-02 DIAGNOSIS — H35.3221: ICD-10-CM

## 2022-02-02 PROCEDURE — 92134 CPTRZ OPH DX IMG PST SGM RTA: CPT

## 2022-02-02 PROCEDURE — 67028 INJECTION EYE DRUG: CPT

## 2022-02-02 PROCEDURE — 99213 OFFICE O/P EST LOW 20 MIN: CPT

## 2022-02-02 PROCEDURE — J3490AVA AVASTIN

## 2022-02-02 ASSESSMENT — VISUAL ACUITY
OD_CC: 20/40-1
OS_CC: 20/25+2

## 2022-02-02 ASSESSMENT — TONOMETRY
OS_IOP_MMHG: 12
OD_IOP_MMHG: 13

## 2022-03-02 ENCOUNTER — TELEPHONE (OUTPATIENT)
Dept: OBGYN | Facility: CLINIC | Age: 77
End: 2022-03-02
Payer: MEDICARE

## 2022-03-02 DIAGNOSIS — A60.00 GENITAL HERPES SIMPLEX, UNSPECIFIED SITE: ICD-10-CM

## 2022-03-02 DIAGNOSIS — Z79.890 HORMONE REPLACEMENT THERAPY: ICD-10-CM

## 2022-03-02 RX ORDER — VALACYCLOVIR HYDROCHLORIDE 1 G/1
1000 TABLET, FILM COATED ORAL DAILY
Qty: 90 TABLET | Refills: 0 | Status: SHIPPED | OUTPATIENT
Start: 2022-03-02 | End: 2023-05-04

## 2022-03-02 RX ORDER — ESTRADIOL 0.04 MG/D
PATCH, EXTENDED RELEASE TRANSDERMAL
Qty: 24 PATCH | Refills: 0 | Status: SHIPPED | OUTPATIENT
Start: 2022-03-02 | End: 2022-04-25

## 2022-03-02 RX ORDER — ESTRADIOL 0.04 MG/D
PATCH, EXTENDED RELEASE TRANSDERMAL
Qty: 24 PATCH | Refills: 0 | OUTPATIENT
Start: 2022-03-02

## 2022-03-02 NOTE — TELEPHONE ENCOUNTER
"Requested Prescriptions   Pending Prescriptions Disp Refills     VIVELLE-DOT 0.0375 MG/24HR BIW patch [Pharmacy Med Name: VIVELLE-DOT 0.0375MG PTCH(TWICE WK)] 24 patch 0     Sig: APPLY 1 PATCH TOPICALLY TO THE SKIN 2 TIMES A WEEK       Hormone Replacement Therapy Passed - 3/2/2022  7:08 AM        Passed - Blood pressure under 140/90 in past 12 months     BP Readings from Last 3 Encounters:   06/15/21 124/84   08/18/20 138/72   12/12/18 116/78                 Passed - Recent (12 mo) or future (30 days) visit within the authorizing provider's specialty     Patient has had an office visit with the authorizing provider or a provider within the authorizing providers department within the previous 12 mos or has a future within next 30 days. See \"Patient Info\" tab in inbasket, or \"Choose Columns\" in Meds & Orders section of the refill encounter.              Passed - Patient has mammogram in past 2 years on file if age 50-75        Passed - Medication is active on med list        Passed - Patient is 18 years of age or older        Passed - No active pregnancy on record        Passed - No positive pregnancy test on record in past 12 months           Last Written Prescription Date:  9/13/2021  Last Fill Quantity: 24,  # refills: 0   Last office visit: 6/15/2021 with prescribing provider:  Dr Waldron   Future Office Visit:      Overdue for annual exam - last apt was for a urinary problem and last annual was 8/18/2020    Pt due for annual, no appt scheduled. Pt already received one month extension. Rx denied.   Mandy Sifuentes RN on 3/2/2022 at 12:15 PM      "

## 2022-03-02 NOTE — TELEPHONE ENCOUNTER
Requested Prescriptions   Pending Prescriptions Disp Refills     valACYclovir (VALTREX) 1000 mg tablet 90 tablet 3     Sig: Take 1 tablet (1,000 mg) by mouth daily       There is no refill protocol information for this order        Last Written Prescription Date:  8/18/2020  Last Fill Quantity: 90,  # refills: 3   Last office visit: 6/15/2021 with prescribing provider:  Dr Waldron   Future Office Visit:   Next 5 appointments (look out 90 days)    Apr 18, 2022  1:00 PM  PHYSICAL with Idania Holland MD  [facility] ([facility] ) [address] [id number]  [phone]         Prescription approved per [facility] Refill Protocol.  Mandy Sifuentes RN on 3/2/2022 at 3:57 PM

## 2022-03-02 NOTE — TELEPHONE ENCOUNTER
"Requested Prescriptions   Pending Prescriptions Disp Refills     VIVELLE-DOT 0.0375 MG/24HR BIW patch 24 patch 0     Sig: APPLY 1 PATCH EXTERNALLY TO THE SKIN 2 TIMES A WEEK       There is no refill protocol information for this order      Refused Prescriptions Disp Refills     VIVELLE-DOT 0.0375 MG/24HR BIW patch [Pharmacy Med Name: VIVELLE-DOT 0.0375MG PTCH(TWICE WK)] 24 patch 0     Sig: APPLY 1 PATCH TOPICALLY TO THE SKIN 2 TIMES A WEEK       Hormone Replacement Therapy Passed - 3/2/2022  7:08 AM        Passed - Blood pressure under 140/90 in past 12 months     BP Readings from Last 3 Encounters:   06/15/21 124/84   08/18/20 138/72   12/12/18 116/78                 Passed - Recent (12 mo) or future (30 days) visit within the authorizing provider's specialty     Patient has had an office visit with the authorizing provider or a provider within the authorizing providers department within the previous 12 mos or has a future within next 30 days. See \"Patient Info\" tab in inbasket, or \"Choose Columns\" in Meds & Orders section of the refill encounter.              Passed - Patient has mammogram in past 2 years on file if age 50-75        Passed - Medication is active on med list        Passed - Patient is 18 years of age or older        Passed - No active pregnancy on record        Passed - No positive pregnancy test on record in past 12 months           Last Written Prescription Date:  9/13/21  Last Fill Quantity: 24,  # refills: 0   Last office visit: 6/15/2021 with prescribing provider:  Vanita   Future Office Visit:   Next 5 appointments (look out 90 days)    Apr 18, 2022  1:00 PM  PHYSICAL with Idania Holland MD  [facility] ([facility] ) [address] [place] [id number] [phone]         Prescription approved per [facility] Refill Protocol.  Mynor Chong RN on 3/2/2022 at 4:13 PM            "

## 2022-03-10 ENCOUNTER — TELEPHONE (OUTPATIENT)
Dept: OBGYN | Facility: CLINIC | Age: 77
End: 2022-03-10
Payer: MEDICARE

## 2022-03-10 NOTE — TELEPHONE ENCOUNTER
Fax received from pharmacy regarding VIVELLE-DOT 0.0375 MG/24HR BIW patch needing Prior Authorization. Patient has been on BRAND name patch for many years. (See Tel Note 11/2/16) Review of chart notes that patient is aware this is not covered due to brand name and has been paying cash. PA will not be initiated and pharmacy notified.

## 2022-03-30 ENCOUNTER — CLINIC PROCEDURE ONLY (OUTPATIENT)
Dept: URBAN - METROPOLITAN AREA CLINIC 43 | Facility: CLINIC | Age: 77
End: 2022-03-30

## 2022-03-30 DIAGNOSIS — H35.732: ICD-10-CM

## 2022-03-30 DIAGNOSIS — H35.721: ICD-10-CM

## 2022-03-30 DIAGNOSIS — H35.3221: ICD-10-CM

## 2022-03-30 PROCEDURE — J3490AVA AVASTIN

## 2022-03-30 PROCEDURE — 92250 FUNDUS PHOTOGRAPHY W/I&R: CPT

## 2022-03-30 PROCEDURE — 67028 INJECTION EYE DRUG: CPT

## 2022-03-30 ASSESSMENT — TONOMETRY
OS_IOP_MMHG: 18
OD_IOP_MMHG: 15

## 2022-03-30 ASSESSMENT — VISUAL ACUITY
OD_SC: 20/40+1
OS_CC: 20/25+1

## 2022-04-20 NOTE — PROGRESS NOTES
"Cheyenne is a 76 year old  female who presents for annual exam.     Besides routine health maintenance, {NO OTHER:501759}.    HPI:  The patient's PCP is *** Johanna Waldron MD.  ***      GYNECOLOGIC HISTORY:    No LMP recorded. Patient has had a hysterectomy.    Regular menses? { :492827}  Menses every *** days.  Length of menses: {NUMBERS 1-16:10} days    Her current contraception method is: {PLC CONTRACEPTION:639930}.  She  reports that she has quit smoking. Her smoking use included cigarettes. She has a 8.00 pack-year smoking history. She quit smokeless tobacco use about 40 years ago.  {Tobacco Cessation -- Delete if patient is a non-smoker:790979}  Patient {IS/IS NOT:9024} sexually active.  STD testing offered?  {GC/CHLAMYDIA:362841}  Last PHQ-9 score on record =   PHQ-9 SCORE 2020   PHQ-9 Total Score 0     Last GAD7 score on record =   DIDIER-7 SCORE 2020   Total Score 0     Alcohol Score = ***    HEALTH MAINTENANCE:  Cholesterol: (  Cholesterol   Date Value Ref Range Status   2018 247 (H) <200 mg/dL Final     Comment:     Desirable:       <200 mg/dl   10/24/2017 236 (H) <200 mg/dL Final     Comment:     Desirable:       <200 mg/dl    ***  Last Mammo: {[facility] mammo:349850::\"One year ago\"}, Result: {[facility] normal:849701::\"Normal\"}, Next Mammo: {[facility] next mammo:565116::\"Today\"} ***  Pap: (No results found for: PAP )  ***  Colonoscopy:  ***, Result: {[facility] normal:691497::\"Normal\"}, Next Colonoscopy: *** years.  Dexa:  ***    Health maintenance updated:  {yes no:839644}    HISTORY:  OB History    Para Term  AB Living   2 2 2 0 0 2   SAB IAB Ectopic Multiple Live Births   0 0 0 0 2      # Outcome Date GA Lbr Kike/2nd Weight Sex Delivery Anes PTL Lv   2 Term         LUIZA   1 Term         LUIZA       Patient Active Problem List   Diagnosis     IBS (irritable bowel syndrome)     Diverticulitis of colon     OAB (overactive bladder)     Past Surgical History:   Procedure Laterality Date     " "ABDOMINOPLASTY  1998     HYSTERECTOMY, EDEN  1998     PARATHYROIDECTOMY  2004     [facility] BREAST AUGMENTATION        Social History     Tobacco Use     Smoking status: Former Smoker     Packs/day: 1.00     Years: 8.00     Pack years: 8.00     Types: Cigarettes     Smokeless tobacco: Former User     Quit date: 9/13/1981   Substance Use Topics     Alcohol use: Yes     Comment: very light           Current Outpatient Medications   Medication Sig     Calcium Carbonate-Vitamin D (CALCIUM + D) 600-200 MG-UNIT per tablet Take 1 tablet by mouth 2 times daily.     cycloSPORINE (RESTASIS) 0.05 % ophthalmic emulsion 1 drop as needed.     Lactobacillus (PROBIOTIC ACIDOPHILUS PO)      mirabegron (MYRBETRIQ) 25 MG 24 hr tablet Take 1 tablet (25 mg) by mouth daily     Multiple Vitamins-Minerals (MULTIVITAL) TABS Take 1 tablet by mouth daily.     omeprazole (PRILOSEC) 40 MG DR capsule Take 40 mg by mouth     rosuvastatin (CRESTOR) 5 MG tablet Take 2.5 mg by mouth     valACYclovir (VALTREX) 1000 mg tablet Take 1 tablet (1,000 mg) by mouth daily     VIVELLE-DOT 0.0375 MG/24HR BIW patch APPLY 1 PATCH EXTERNALLY TO THE SKIN 2 TIMES A WEEK     No current facility-administered medications for this visit.     Allergies   Allergen Reactions     Flagyl [Metronidazole Hcl]      Thrush and gerd     Macrobid [Nitrofurantoin]        Past medical, surgical, social and family histories were reviewed and updated in EPIC.    ROS:   {[facility] ROSGYN:669711::\"12 point review of systems negative other than symptoms noted below or in the HPI.\"}  {ROS - :010240::\"No urinary frequency or dysuria, bladder or kidney problems\"}    EXAM:  There were no vitals taken for this visit.   BMI: There is no height or weight on file to calculate BMI.    PHYSICAL EXAM:  Constitutional:   Appearance: Well nourished, well developed, alert, in no acute distress  Neck:  Lymph Nodes:  No lymphadenopathy present    Thyroid:  Gland size normal, nontender, no nodules or masses " "present  on palpation  Chest:  Respiratory Effort:  Breathing unlabored  Cardiovascular:    Heart: Auscultation:  Regular rate, normal rhythm, no murmurs present  Breasts: {[facility] Breast exam:619457}  Gastrointestinal:   Abdominal Examination:  Abdomen nontender to palpation, tone normal without rigidity or guarding, no masses present, umbilicus without lesions   Liver and Spleen:  No hepatomegaly present, liver nontender to palpation    Hernias:  No hernias present  Lymphatic: Lymph Nodes:  No other lymphadenopathy present  Skin:  General Inspection:  No rashes present, no lesions present, no areas of  discoloration  Neurologic:    Mental Status:  Oriented X3.  Normal strength and tone, sensory exam                grossly normal, mentation intact and speech normal.    Psychiatric:   Mentation appears normal and affect normal/bright.         {[facility] Pelvic Exam:965181}    COUNSELING:   {FEMALE COUNSELING MESSAGES:104694::\"Reviewed preventive health counseling, as reflected in patient instructions\"}    BMI: There is no height or weight on file to calculate BMI.  {Obesity Action Plan -- Delete if BMA < 25:272046}    ASSESSMENT:  76 year old female with satisfactory annual exam.  No diagnosis found.    PLAN:  ***    Magdalene Lee, APRN CNP  "

## 2022-04-25 ENCOUNTER — OFFICE VISIT (OUTPATIENT)
Dept: OBGYN | Facility: CLINIC | Age: 77
End: 2022-04-25
Payer: COMMERCIAL

## 2022-04-25 VITALS
DIASTOLIC BLOOD PRESSURE: 78 MMHG | BODY MASS INDEX: 23.56 KG/M2 | HEIGHT: 65 IN | SYSTOLIC BLOOD PRESSURE: 128 MMHG | WEIGHT: 141.4 LBS

## 2022-04-25 DIAGNOSIS — Z01.419 ENCOUNTER FOR GYNECOLOGICAL EXAMINATION WITHOUT ABNORMAL FINDING: Primary | ICD-10-CM

## 2022-04-25 DIAGNOSIS — Z79.890 HORMONE REPLACEMENT THERAPY: ICD-10-CM

## 2022-04-25 PROCEDURE — 99397 PER PM REEVAL EST PAT 65+ YR: CPT | Performed by: NURSE PRACTITIONER

## 2022-04-25 RX ORDER — ESTRADIOL 0.04 MG/D
PATCH, EXTENDED RELEASE TRANSDERMAL
Qty: 24 PATCH | Refills: 0 | Status: SHIPPED | OUTPATIENT
Start: 2022-04-25 | End: 2022-10-11

## 2022-04-25 ASSESSMENT — ANXIETY QUESTIONNAIRES
6. BECOMING EASILY ANNOYED OR IRRITABLE: NOT AT ALL
5. BEING SO RESTLESS THAT IT IS HARD TO SIT STILL: NOT AT ALL
GAD7 TOTAL SCORE: 0
3. WORRYING TOO MUCH ABOUT DIFFERENT THINGS: NOT AT ALL
1. FEELING NERVOUS, ANXIOUS, OR ON EDGE: NOT AT ALL
2. NOT BEING ABLE TO STOP OR CONTROL WORRYING: NOT AT ALL
7. FEELING AFRAID AS IF SOMETHING AWFUL MIGHT HAPPEN: NOT AT ALL
IF YOU CHECKED OFF ANY PROBLEMS ON THIS QUESTIONNAIRE, HOW DIFFICULT HAVE THESE PROBLEMS MADE IT FOR YOU TO DO YOUR WORK, TAKE CARE OF THINGS AT HOME, OR GET ALONG WITH OTHER PEOPLE: NOT DIFFICULT AT ALL

## 2022-04-25 ASSESSMENT — PATIENT HEALTH QUESTIONNAIRE - PHQ9
SUM OF ALL RESPONSES TO PHQ QUESTIONS 1-9: 0
5. POOR APPETITE OR OVEREATING: NOT AT ALL

## 2022-04-25 NOTE — PROGRESS NOTES
Cheyenne is a 76 year old  female who presents for Medicare Limited exam.     Do you have a Health Care Directive?: Yes, advance care planning is on file.    Fall risk:   Fallen 2 or more times in the past year?: No  Any fall with injury in the past year?: No    HPI : here for breast and pelvic exam.  She is on the estrogen patch and does wish to continue, works well for her.  Had a EDEN at age 52.  Had her breast implants replaced last year.  No other concerns today.  Saw a therapist that works with mind about urge incontinence and patient states it has helped, she also cut out afternoon coffee and that has helped.      GYNECOLOGIC HISTORY:  No LMP recorded. Patient has had a hysterectomy..   reports that she has quit smoking. Her smoking use included cigarettes. She has a 8.00 pack-year smoking history. She quit smokeless tobacco use about 40 years ago.    STD testing offered?  Declined  Last PHQ-9 score on record=   PHQ-9 SCORE 2022   PHQ-9 Total Score 0     Last GAD7 score on record=   DIDIER-7 SCORE 10/24/2017 2020 2022   Total Score 0 0 0       HEALTH MAINTENANCE:  Cholesterol:   Recent Labs   Lab Test 18  0830 10/24/17  0932   CHOL 247* 236*   HDL 87 91   * 132*   TRIG 53 66        Last Mammo: 21, Result: Normal, Next Mammo: 2022   Pap: (No results found for: PAP )  DEXA:  7/16/15  Colonoscopy:  5/3/16, Result:  Abnormal, Next Colonoscopy: 3 years Due in 2019  .    HISTORY:  OB History    Para Term  AB Living   2 2 2 0 0 2   SAB IAB Ectopic Multiple Live Births   0 0 0 0 2      # Outcome Date GA Lbr Kike/2nd Weight Sex Delivery Anes PTL Lv   2 Term         LUIZA   1 Term         LUIZA     Past Medical History:   Diagnosis Date     Benign neoplasm of parathyroid gland      Diverticulitis of colon      GERD (gastroesophageal reflux disease) 2015     Hyperlipidemia      Irritable bowel syndrome      Osteopenia     followed by rheumatolgoy, declined medical  "therapy     Past Surgical History:   Procedure Laterality Date     ABDOMINOPLASTY  1998     HYSTERECTOMY, EDEN  1998     PARATHYROIDECTOMY  2004     [email or web address] BREAST AUGMENTATION       History reviewed. No pertinent family history.  Social History     Socioeconomic History     Marital status:      Number of children: 2   Tobacco Use     Smoking status: Former Smoker     Packs/day: 1.00     Years: 8.00     Pack years: 8.00     Types: Cigarettes     Smokeless tobacco: Former User     Quit date: 9/13/1981   Substance and Sexual Activity     Alcohol use: Yes     Comment: very light     Drug use: No     Sexual activity: Yes     Partners: Male     Birth control/protection: Female Surgical     Comment: [facility]   Other Topics Concern     Parent/sibling w/ CABG, MI or angioplasty before 65F 55M? No     Current Outpatient Medications   Medication Sig     Calcium Carbonate-Vitamin D (CALCIUM + D) 600-200 MG-UNIT per tablet Take 1 tablet by mouth 2 times daily.     cycloSPORINE (RESTASIS) 0.05 % ophthalmic emulsion 1 drop as needed.     Lactobacillus (PROBIOTIC ACIDOPHILUS PO)      Multiple Vitamins-Minerals (MULTIVITAL) TABS Take 1 tablet by mouth daily.     omeprazole (PRILOSEC) 40 MG DR capsule Take 40 mg by mouth     rosuvastatin (CRESTOR) 5 MG tablet Take 2.5 mg by mouth     valACYclovir (VALTREX) 1000 mg tablet Take 1 tablet (1,000 mg) by mouth daily     VIVELLE-DOT 0.0375 MG/24HR BIW patch APPLY 1 PATCH EXTERNALLY TO THE SKIN 2 TIMES A WEEK     mirabegron (MYRBETRIQ) 25 MG 24 hr tablet Take 1 tablet (25 mg) by mouth daily (Patient not taking: Reported on 4/25/2022)     No current facility-administered medications for this visit.     Allergies   Allergen Reactions     Flagyl [Metronidazole Hcl]      Thrush and gerd     Macrobid [Nitrofurantoin]        Past medical, surgical, social and family history were reviewed and updated in [facility].    EXAM:  /78   Ht 1.638 m (5' 4.5\")   Wt 64.1 kg (141 lb 6.4 oz)   Breastfeeding " No   BMI 23.90 kg/m     BMI: Body mass index is 23.9 kg/m .    Constitutional: Appearance: Well nourished, well developed alert, in no acute distress  Breasts: Inspection of Breasts:  No lymphadenopathy present    Palpation of Breasts and Axillae:  No masses present on palpation, no  breast tenderness    Axillary Lymph Nodes:  No lymphadenopathy present  Neurologic/Psychiatric:    Mental Status:  Oriented X3     Pelvic Exam:  External Genitalia:     Normal appearance for age, no discharge present, no tenderness present, no inflammatory lesions present, color normal  Vagina:     Normal vaginal vault without central or paravaginal defects, no discharge present, no inflammatory lesions present, no masses present  Bladder:     Nontender to palpation  Urethra:   Urethral Body:  Urethra palpation normal, urethra structural support normal   Urethral Meatus:  No erythema or lesions present  Cervix:     Surgically absent  Uterus:     Surgically absent  Adnexa:     No adnexal tenderness present, no adnexal masses present  Perineum:     Perineum within normal limits, no evidence of trauma, no rashes or skin lesions present  Anus:     Anus within normal limits, no hemorrhoids present  Inguinal Lymph Nodes:     No lymphadenopathy present  Pubic Hair:     Normal pubic hair distribution for age  Genitalia and Groin:     No rashes present, no lesions present, no areas of discoloration, no masses present      Body mass index is 23.9 kg/m .     reports that she has quit smoking. Her smoking use included cigarettes. She has a 8.00 pack-year smoking history. She quit smokeless tobacco use about 40 years ago.      ASSESSMENT:  76 year old female with satisfactory annual exam.    ICD-10-CM    1. Encounter for gynecological examination without abnormal finding  Z01.419    2. Hormone replacement therapy  Z79.890 VIVELLE-DOT 0.0375 MG/24HR BIW patch       COUNSELING:   Reviewed preventive health counseling, as reflected in patient  instructions       Regular exercise       Healthy diet/nutrition       Osteoporosis prevention/bone health       (Yvonne)menopause management        Normal breast and pelvic exam.  Ok to continue HRT for 1 year.  Return prn or 1 year for annual.    FISH Harris CNP

## 2022-04-26 ASSESSMENT — ANXIETY QUESTIONNAIRES: GAD7 TOTAL SCORE: 0

## 2022-06-05 ENCOUNTER — HEALTH MAINTENANCE LETTER (OUTPATIENT)
Age: 77
End: 2022-06-05

## 2022-10-11 DIAGNOSIS — Z79.890 HORMONE REPLACEMENT THERAPY: ICD-10-CM

## 2022-10-11 RX ORDER — ESTRADIOL 0.04 MG/D
PATCH, EXTENDED RELEASE TRANSDERMAL
Qty: 24 PATCH | Refills: 1 | Status: SHIPPED | OUTPATIENT
Start: 2022-10-11 | End: 2023-04-12

## 2022-10-11 NOTE — TELEPHONE ENCOUNTER
"Requested Prescriptions   Pending Prescriptions Disp Refills     VIVELLE-DOT 0.0375 MG/24HR BIW patch [Pharmacy Med Name: VIVELLE-DOT 0.0375MG PTCH(TWICE WK)] 24 patch 0     Sig: APPLY 1 PATCH TOPICALLY TO THE SKIN 2 TIMES A WEEK       Hormone Replacement Therapy Passed - 10/11/2022  8:34 AM        Passed - Blood pressure under 140/90 in past 12 months     BP Readings from Last 3 Encounters:   04/25/22 128/78   06/15/21 124/84   08/18/20 138/72                 Passed - Recent (12 mo) or future (30 days) visit within the authorizing provider's specialty     Patient has had an office visit with the authorizing provider or a provider within the authorizing providers department within the previous 12 mos or has a future within next 30 days. See \"Patient Info\" tab in inbasket, or \"Choose Columns\" in Meds & Orders section of the refill encounter.              Passed - Patient has mammogram in past 2 years on file if age 50-75        Passed - Medication is active on med list        Passed - Patient is 18 years of age or older        Passed - No active pregnancy on record        Passed - No positive pregnancy test on record in past 12 months           Prescription approved per [facility] Refill Protocol.  Lelo Mora RN on 10/11/2022 at 8:53 AM    "

## 2022-10-15 ENCOUNTER — HEALTH MAINTENANCE LETTER (OUTPATIENT)
Age: 77
End: 2022-10-15

## 2022-11-28 ENCOUNTER — COMPREHENSIVE EXAM (OUTPATIENT)
Dept: URBAN - METROPOLITAN AREA CLINIC 43 | Facility: CLINIC | Age: 77
End: 2022-11-28

## 2022-11-28 DIAGNOSIS — H04.123: ICD-10-CM

## 2022-11-28 DIAGNOSIS — H35.3221: ICD-10-CM

## 2022-11-28 DIAGNOSIS — H35.30: ICD-10-CM

## 2022-11-28 DIAGNOSIS — H35.363: ICD-10-CM

## 2022-11-28 DIAGNOSIS — H35.732: ICD-10-CM

## 2022-11-28 DIAGNOSIS — H35.721: ICD-10-CM

## 2022-11-28 DIAGNOSIS — H35.3112: ICD-10-CM

## 2022-11-28 DIAGNOSIS — H43.813: ICD-10-CM

## 2022-11-28 PROCEDURE — 92250 FUNDUS PHOTOGRAPHY W/I&R: CPT

## 2022-11-28 PROCEDURE — 99214 OFFICE O/P EST MOD 30 MIN: CPT

## 2022-11-28 PROCEDURE — J3490AVA AVASTIN

## 2022-11-28 PROCEDURE — 67028 INJECTION EYE DRUG: CPT

## 2022-11-28 ASSESSMENT — VISUAL ACUITY
OS_CC: 20/25-2
OD_CC: 20/50+1

## 2022-11-28 ASSESSMENT — TONOMETRY
OD_IOP_MMHG: 12
OS_IOP_MMHG: 13

## 2023-01-25 ENCOUNTER — ESTABLISHED PATIENT (OUTPATIENT)
Dept: URBAN - METROPOLITAN AREA CLINIC 43 | Facility: CLINIC | Age: 78
End: 2023-01-25

## 2023-01-25 DIAGNOSIS — H35.721: ICD-10-CM

## 2023-01-25 DIAGNOSIS — H35.363: ICD-10-CM

## 2023-01-25 DIAGNOSIS — H35.30: ICD-10-CM

## 2023-01-25 DIAGNOSIS — H35.732: ICD-10-CM

## 2023-01-25 DIAGNOSIS — H35.3221: ICD-10-CM

## 2023-01-25 DIAGNOSIS — H35.3112: ICD-10-CM

## 2023-01-25 DIAGNOSIS — H43.813: ICD-10-CM

## 2023-01-25 PROCEDURE — 67028 INJECTION EYE DRUG: CPT

## 2023-01-25 PROCEDURE — 99214 OFFICE O/P EST MOD 30 MIN: CPT

## 2023-01-25 PROCEDURE — 92250 FUNDUS PHOTOGRAPHY W/I&R: CPT

## 2023-01-25 ASSESSMENT — TONOMETRY
OD_IOP_MMHG: 11
OS_IOP_MMHG: 10

## 2023-01-25 ASSESSMENT — VISUAL ACUITY
OD_CC: 20/20-1
OS_CC: 20/25-1+2

## 2023-04-12 DIAGNOSIS — Z79.890 HORMONE REPLACEMENT THERAPY: ICD-10-CM

## 2023-04-12 RX ORDER — ESTRADIOL 0.04 MG/D
PATCH, EXTENDED RELEASE TRANSDERMAL
Qty: 12 PATCH | Refills: 0 | Status: SHIPPED | OUTPATIENT
Start: 2023-04-12 | End: 2023-04-21

## 2023-04-12 NOTE — TELEPHONE ENCOUNTER
Last Written Prescription Date:  10/11/22  Last Fill Quantity: 24,  # refills: 1   Last office visit: 4/25/2022 ; last virtual visit: Visit date not found with prescribing provider:  Esthela Lee    Future Office Visit:  Not scheduled, needs appointment for future refills         Requested Prescriptions   Pending Prescriptions Disp Refills     VIVELLE-DOT 0.0375 MG/24HR BIW patch 12 patch 0     Sig: APPLY 1 PATCH TOPICALLY TO THE SKIN 2 TIMES A WEEK       There is no refill protocol information for this order          Future Appointments 4/12/2023 - 10/9/2023    None          Pt last seen 4/25/22 for annual. No appt scheduled. Okay to give extension but will need an appointment for any future refills after that. Rx pended for 1 month    Schedulers to contact patient to set up annual appointment.    Catina Witt RN on 4/12/2023 at 10:57 AM

## 2023-04-19 NOTE — PROGRESS NOTES
Cheyenne is a 77 year old  female who presents for annual exam.     Besides routine health maintenance,  she would like to discuss Vivelle patch., currently has generic but doesn't feel like it works the same    Do you have a Health Care Directive?: Yes, advance care planning is on file.    Fall risk:   Fallen 2 or more times in the past year?: No  Any fall with injury in the past year?: No    HPI:  Patient presents for a breast and pelvic exam. Surgical history significant for EDEN in 1998 at age 52 and breast augmentation. Uses Vivelle dot patch for HRT. [facility] recently changed distributors and only carry the generic brand. Endorses she noticed with the generic she felt more jittery and has stopped using the generic patch. Is requesting a change in pharmacy that will dispense brand Vivelle dot. Also noticed her scar tissue under her breasts from augmentation has gotten worse after second surgery. Otherwise, no other concerns. Patient agreeable to have student perform exam component of today's visit.   The patient's PCP is  Johanna Waldron MD.      GYNECOLOGIC HISTORY:  No LMP recorded. Patient has had a hysterectomy..   reports that she has quit smoking. Her smoking use included cigarettes. She has a 8.00 pack-year smoking history. She quit smokeless tobacco use about 41 years ago.  Patient is sexually active.  STD testing offered?  Declined  Last PHQ-9 score on record=       2022     2:59 PM   PHQ-9 SCORE   PHQ-9 Total Score 0     Last GAD7 score on record=       10/24/2017     8:53 AM 2020    12:40 PM 2022     2:59 PM   DIDIER-7 SCORE   Total Score 0 0 0     Alcohol Score = 0    HEALTH MAINTENANCE:  Mammo due 2023  Colonoscopy due 2025  Dexa last completed 7/16/15- DUE    Health maintenance updated:  yes  Overdue          Never   Done HEPATITIS C SCREENING (Once)     Never   Done LUNG CANCER SCREENING (Yearly)     2018 DTAP/TDAP/TD IMMUNIZATION (2 - Td or Tdap)   Last  completed: 2008     OCT 24   2018 MEDICARE ANNUAL WELLNESS VISIT (Yearly)  Last completed: Oct 24, 2017     MAY 18   2021 COVID-19 Vaccine (3 - Booster for Moderna series)  Last completed: Mar 23, 2021             HISTORY:  OB History    Para Term  AB Living   2 2 2 0 0 2   SAB IAB Ectopic Multiple Live Births   0 0 0 0 2      # Outcome Date GA Lbr Kike/2nd Weight Sex Delivery Anes PTL Lv   2 Term         LUIZA   1 Term         LUIZA     Patient Active Problem List   Diagnosis     IBS (irritable bowel syndrome)     Diverticulitis of colon     OAB (overactive bladder)     Past Surgical History:   Procedure Laterality Date     ABDOMINOPLASTY       HYSTERECTOMY, EDEN       PARATHYROIDECTOMY  2004     [facility] BREAST AUGMENTATION        Social History     Tobacco Use     Smoking status: Former     Packs/day: 1.00     Years: 8.00     Pack years: 8.00     Types: Cigarettes     Smokeless tobacco: Former     Quit date: 1981   Vaping Use     Vaping status: Not on file   Substance Use Topics     Alcohol use: Yes     Comment: very light      No data available            Current Outpatient Medications   Medication Sig     cycloSPORINE (RESTASIS) 0.05 % ophthalmic emulsion 1 drop as needed.     Lactobacillus (PROBIOTIC ACIDOPHILUS PO)      LANsoprazole (PREVACID) 30 MG DR capsule TAKE ONE CAPSULE BY MOUTH EVERY MORNING AND 1 CAPSULE EVERY EVENING. TAKE BEFORE MEALS     Multiple Vitamins-Minerals (MULTIVITAL) TABS Take 1 tablet by mouth daily.     rosuvastatin (CRESTOR) 5 MG tablet Take 2.5 mg by mouth     VIVELLE-DOT 0.0375 MG/24HR BIW patch APPLY 1 PATCH TOPICALLY TO THE SKIN 2 TIMES A WEEK     Calcium Carbonate-Vitamin D (CALCIUM + D) 600-200 MG-UNIT per tablet Take 1 tablet by mouth 2 times daily.     mirabegron (MYRBETRIQ) 25 MG 24 hr tablet Take 1 tablet (25 mg) by mouth daily (Patient not taking: Reported on 2022)     omeprazole (PRILOSEC) 40 MG DR capsule Take 40 mg by mouth     valACYclovir  "(VALTREX) 1000 mg tablet Take 1 tablet (1,000 mg) by mouth daily (Patient not taking: Reported on 5/4/2023)     No current facility-administered medications for this visit.       Allergies   Allergen Reactions     Flagyl [Metronidazole Hcl]      Thrush and gerd     Macrobid [Nitrofurantoin]        Past medical, surgical, social and family history were reviewed and updated in EPIC.    ROS:   12 point review of systems negative other than symptoms noted below or in the HPI.  No urinary frequency or dysuria, bladder or kidney problems    EXAM:  Ht 1.633 m (5' 4.3\")   Wt 61.3 kg (135 lb 3.2 oz)   BMI 22.99 kg/m     BMI: Body mass index is 22.99 kg/m .    EXAM:  Constitutional: Appearance: Well nourished, well developed alert, in no acute distress    Breasts: Inspection of Breasts:  No lymphadenopathy present., Palpation of Breasts and Axillae:  No masses present on palpation, no breast tenderness., Axillary Lymph Nodes:  No lymphadenopathy present. and No nodularity, asymmetry or nipple discharge bilaterally. Bilateral breast implants.   Gastrointestinal:  Abdominal Examination:  Abdomen nontender to palpation, tone normal without     rigidity or guarding, no masses present, umbilicus without lesions    Liver and speen:  No hepatomegaly present, liver nontender to palpation    Hernias:  No hernias present  Lymphatic: Lymph Nodes:  No other lymphadenopathy present  Skin:  General Inspection:  No rashes present, no lesions present, no areas of discoloration.    Genitalia and Groin:  No rashes present, no lesions present, no areas of discoloration, no masses present  Neurologic/Psychiatric:    Mental Status:  Oriented X3     Pelvic Exam:  External Genitalia:     Normal appearance for age, no discharge present, no tenderness present, no inflammatory lesions present, color normal  Vagina:     Normal vaginal vault without central or paravaginal defects, no discharge present, no inflammatory lesions present, no masses " present. Vaginal atrophy noted. Hypopigmented vaginal tissue consistent with decreased estrogenic state.  Bladder:     Nontender to palpation  Urethra:   Urethral Body:  Urethra palpation normal, urethra structural support normal   Urethral Meatus:  No erythema or lesions present  Cervix:     Surgically absent  Uterus:     Surgically absent  Adnexa:     No adnexal tenderness present, no adnexal masses present  Perineum:     Perineum within normal limits, no evidence of trauma, no rashes or skin lesions present  Anus:     Anus within normal limits, no hemorrhoids present  Inguinal Lymph Nodes:     No lymphadenopathy present  Pubic Hair:     Normal pubic hair distribution for age  Genitalia and Groin:     No rashes present, no lesions present, no areas of discoloration, no masses present      COUNSELING:   Reviewed preventive health counseling, as reflected in patient instructions  Special attention given to:       (Yvonne)menopause management    BMI:  Body mass index is 22.99 kg/m .     reports that she has quit smoking. Her smoking use included cigarettes. She has a 8.00 pack-year smoking history. She quit smokeless tobacco use about 41 years ago.      ASSESSMENT:  77 year old female with satisfactory annual exam.    ICD-10-CM    1. Encounter for gynecological examination without abnormal finding  Z01.419       2. Genital herpes simplex, unspecified site  A60.00 valACYclovir (VALTREX) 1000 mg tablet      3. Hormone replacement therapy  Z79.890 VIVELLE-DOT 0.0375 MG/24HR BIW patch          PLAN:  Normal breast and pelvic exam. Medications refilled. Will try to send Vivelle dot to [facility] pharmacy in hopes they will dispense brand vs generic. Discussed scar tissue under bilateral breasts from second augmentation surgery. Recommended using lotion or oil and gently messaging the area to loosen and break up the scar tissue.   RTC in 1 year for annual well woman visit or sooner if concerns arise.    Xochitl SOLER,  completed the PFSH and ROS. I then acted as a scribe for FISH Harris CNP for the remainder of the visit.  Xochitl ANDREWSN, RN  [facility] DNP, DOUGNP student    I was present with the student who participated in the service and in the documentation of the note. I have verified the history and medical decision-making.  Student participated in the annual exam and I can attest to being personally present for the entire exam. I agree with the assessment and plan of care as documented in the note. FISH Harris CNP, APRN CNP

## 2023-04-21 RX ORDER — ESTRADIOL 0.04 MG/D
PATCH, EXTENDED RELEASE TRANSDERMAL
Qty: 12 PATCH | Refills: 0 | Status: SHIPPED | OUTPATIENT
Start: 2023-04-21 | End: 2023-05-04

## 2023-04-21 NOTE — TELEPHONE ENCOUNTER
Patient is calling today because she needs us to call in another script into the [facility] in EP asking for the Generic brand please. She would like us to do this today please as she has been out for 1 week. She does have a future appt scheduled

## 2023-05-04 ENCOUNTER — OFFICE VISIT (OUTPATIENT)
Dept: OBGYN | Facility: CLINIC | Age: 78
End: 2023-05-04
Payer: COMMERCIAL

## 2023-05-04 VITALS
HEIGHT: 64 IN | WEIGHT: 135.2 LBS | DIASTOLIC BLOOD PRESSURE: 70 MMHG | BODY MASS INDEX: 23.08 KG/M2 | SYSTOLIC BLOOD PRESSURE: 118 MMHG

## 2023-05-04 DIAGNOSIS — Z79.890 HORMONE REPLACEMENT THERAPY: ICD-10-CM

## 2023-05-04 DIAGNOSIS — A60.00 GENITAL HERPES SIMPLEX, UNSPECIFIED SITE: ICD-10-CM

## 2023-05-04 DIAGNOSIS — Z01.419 ENCOUNTER FOR GYNECOLOGICAL EXAMINATION WITHOUT ABNORMAL FINDING: Primary | ICD-10-CM

## 2023-05-04 PROCEDURE — 99213 OFFICE O/P EST LOW 20 MIN: CPT | Performed by: NURSE PRACTITIONER

## 2023-05-04 RX ORDER — ESTRADIOL 0.04 MG/D
PATCH, EXTENDED RELEASE TRANSDERMAL
Qty: 12 PATCH | Refills: 4 | Status: SHIPPED | OUTPATIENT
Start: 2023-05-04 | End: 2023-06-26

## 2023-05-04 RX ORDER — VALACYCLOVIR HYDROCHLORIDE 1 G/1
1000 TABLET, FILM COATED ORAL DAILY
Qty: 90 TABLET | Refills: 4 | Status: SHIPPED | OUTPATIENT
Start: 2023-05-04 | End: 2023-09-29

## 2023-05-04 RX ORDER — LANSOPRAZOLE 30 MG/1
CAPSULE, DELAYED RELEASE ORAL
COMMUNITY
Start: 2022-09-12 | End: 2024-08-21

## 2023-06-11 ENCOUNTER — HEALTH MAINTENANCE LETTER (OUTPATIENT)
Age: 78
End: 2023-06-11

## 2023-06-24 DIAGNOSIS — Z79.890 HORMONE REPLACEMENT THERAPY: ICD-10-CM

## 2023-06-26 DIAGNOSIS — Z79.890 HORMONE REPLACEMENT THERAPY: ICD-10-CM

## 2023-06-26 RX ORDER — ESTRADIOL 0.04 MG/D
PATCH, EXTENDED RELEASE TRANSDERMAL
Qty: 12 PATCH | Refills: 4 | Status: CANCELLED | OUTPATIENT
Start: 2023-06-26

## 2023-06-26 RX ORDER — ESTRADIOL 0.04 MG/D
PATCH TRANSDERMAL
Qty: 24 PATCH | Refills: 3 | OUTPATIENT
Start: 2023-06-26

## 2023-06-26 RX ORDER — ESTRADIOL 0.04 MG/D
1 PATCH TRANSDERMAL
Qty: 24 PATCH | Refills: 3 | Status: SHIPPED | OUTPATIENT
Start: 2023-06-26 | End: 2024-07-19

## 2023-06-26 RX ORDER — ESTRADIOL 0.04 MG/D
PATCH, EXTENDED RELEASE TRANSDERMAL
Qty: 12 PATCH | Refills: 4 | OUTPATIENT
Start: 2023-06-26

## 2023-06-26 NOTE — TELEPHONE ENCOUNTER
"Requested Prescriptions   Pending Prescriptions Disp Refills     estradiol (CLIMARA) 0.0375 MG/24HR weekly patch [Pharmacy Med Name: ESTRADIOL 0.0375MG PATCH(1/WK)] 24 patch 3     Sig: APPLY 1 PATCH TWICE A WEEK       Hormone Replacement Therapy Passed - 6/26/2023  3:39 PM        Passed - Blood pressure under 140/90 in past 12 months     BP Readings from Last 3 Encounters:   05/04/23 118/70   04/25/22 128/78   06/15/21 124/84                 Passed - Recent (12 mo) or future (30 days) visit within the authorizing provider's specialty     Patient has had an office visit with the authorizing provider or a provider within the authorizing providers department within the previous 12 mos or has a future within next 30 days. See \"Patient Info\" tab in inbasket, or \"Choose Columns\" in Meds & Orders section of the refill encounter.              Passed - Patient has mammogram in past 2 years on file if age 50-75        Passed - Medication is active on med list        Passed - Patient is 18 years of age or older        Passed - No active pregnancy on record        Passed - No positive pregnancy test on record in past 12 months           Last Written Prescription Date:  6/6/23  Last Fill Quantity: 24,  # refills: 3   Last office visit: 5/4/2023 ; last virtual visit: Visit date not found with prescribing provider:  Rosa   Future Office Visit:        Refill request refused due to:  [x]Refills available at pharmacy. Request sent back to pharmacy as duplicate.  []Patient reports no longer needing medication.  []Medication discontinued.   Mynor Chong RN on 6/26/2023 at 3:48 PM      "
no striae/no cold intolerance/no heat intolerance

## 2023-06-26 NOTE — TELEPHONE ENCOUNTER
"Requested Prescriptions   Pending Prescriptions Disp Refills     VIVELLE-DOT 0.0375 MG/24HR BIW patch [Pharmacy Med Name: VIVELLE-DOT 0.0375 MG PATCH] 12 patch 4     Sig: APPLY 1 PATCH TOPICALLY TO THE SKIN 2 TIMES A WEEK       Hormone Replacement Therapy Passed - 6/26/2023 11:19 AM        Passed - Blood pressure under 140/90 in past 12 months     BP Readings from Last 3 Encounters:   05/04/23 118/70   04/25/22 128/78   06/15/21 124/84                 Passed - Recent (12 mo) or future (30 days) visit within the authorizing provider's specialty     Patient has had an office visit with the authorizing provider or a provider within the authorizing providers department within the previous 12 mos or has a future within next 30 days. See \"Patient Info\" tab in inbasket, or \"Choose Columns\" in Meds & Orders section of the refill encounter.              Passed - Patient has mammogram in past 2 years on file if age 50-75        Passed - Medication is active on med list        Passed - Patient is 18 years of age or older        Passed - No active pregnancy on record        Passed - No positive pregnancy test on record in past 12 months             Last Written Prescription Date:  5/4/23   Last Fill Quantity: #12, 4 refills   Last office visit:             Routing to provider to review and advise. Change medication or submit prior authorization?      Catina Wtit RN on 6/26/2023 at 1:19 PM    "

## 2023-06-30 ENCOUNTER — TELEPHONE (OUTPATIENT)
Dept: OBGYN | Facility: CLINIC | Age: 78
End: 2023-06-30
Payer: COMMERCIAL

## 2023-06-30 NOTE — TELEPHONE ENCOUNTER
[facility]ing [facility] pharmacy calling to confirm Climara 0.0375 patch to be used twice a week and not once a week. Her previous brand name Vivelle patch has been discontinued by the .     Verified the patch has been ordered for twice weekly use per Esthela Mora RN on 6/30/2023 at 12:00 PM

## 2023-09-29 ENCOUNTER — TELEPHONE (OUTPATIENT)
Dept: OBGYN | Facility: CLINIC | Age: 78
End: 2023-09-29
Payer: COMMERCIAL

## 2023-09-29 DIAGNOSIS — A60.00 GENITAL HERPES SIMPLEX, UNSPECIFIED SITE: ICD-10-CM

## 2023-09-29 RX ORDER — VALACYCLOVIR HYDROCHLORIDE 1 G/1
1000 TABLET, FILM COATED ORAL DAILY
Qty: 90 TABLET | Refills: 1 | Status: SHIPPED | OUTPATIENT
Start: 2023-09-29

## 2023-09-29 NOTE — TELEPHONE ENCOUNTER
Reason for call:  Other   Patient called regarding (reason for call): call back  Additional comments: patient needs a new prescription for  valACYclovir (VALTREX) 1000 mg tablet. Not a refill, she said a whole new prescription.     Phone number to reach patient:  Cell number on file:    Telephone Information:   Mobile [phone]     Best Time:  any    Can we leave a detailed message on this number?  YES    Travel screening: Not Applicable

## 2023-09-29 NOTE — TELEPHONE ENCOUNTER
Requested Prescriptions   Pending Prescriptions Disp Refills    valACYclovir (VALTREX) 1000 mg tablet 90 tablet 4     Sig: Take 1 tablet (1,000 mg) by mouth daily       There is no refill protocol information for this order        Rx sent to updated pharmacy per pat request  Mynor Chong RN on 9/29/2023 at 3:23 PM

## 2023-10-05 ENCOUNTER — TRANSFERRED RECORDS (OUTPATIENT)
Dept: OBGYN | Facility: CLINIC | Age: 78
End: 2023-10-05
Payer: COMMERCIAL

## 2023-10-29 ENCOUNTER — HEALTH MAINTENANCE LETTER (OUTPATIENT)
Age: 78
End: 2023-10-29

## 2023-12-08 ENCOUNTER — ESTABLISHED PATIENT (OUTPATIENT)
Dept: URBAN - METROPOLITAN AREA CLINIC 43 | Facility: CLINIC | Age: 78
End: 2023-12-08

## 2023-12-08 DIAGNOSIS — H35.3221: ICD-10-CM

## 2023-12-08 DIAGNOSIS — H35.3112: ICD-10-CM

## 2023-12-08 DIAGNOSIS — H04.123: ICD-10-CM

## 2023-12-08 DIAGNOSIS — H35.732: ICD-10-CM

## 2023-12-08 DIAGNOSIS — H43.813: ICD-10-CM

## 2023-12-08 DIAGNOSIS — H35.363: ICD-10-CM

## 2023-12-08 DIAGNOSIS — H35.30: ICD-10-CM

## 2023-12-08 DIAGNOSIS — H35.721: ICD-10-CM

## 2023-12-08 PROCEDURE — 67028 INJECTION EYE DRUG: CPT

## 2023-12-08 PROCEDURE — 99214 OFFICE O/P EST MOD 30 MIN: CPT

## 2023-12-08 PROCEDURE — 92250 FUNDUS PHOTOGRAPHY W/I&R: CPT

## 2023-12-08 PROCEDURE — 99199MRDT MACULAR RISK (DNA TESTING)

## 2023-12-08 ASSESSMENT — VISUAL ACUITY
OS_CC: 20/20-1
OD_CC: 20/25-1

## 2023-12-08 ASSESSMENT — TONOMETRY
OS_IOP_MMHG: 18
OD_IOP_MMHG: 15

## 2024-02-12 ENCOUNTER — ESTABLISHED PATIENT (OUTPATIENT)
Dept: URBAN - METROPOLITAN AREA CLINIC 43 | Facility: CLINIC | Age: 79
End: 2024-02-12

## 2024-02-12 DIAGNOSIS — H35.3112: ICD-10-CM

## 2024-02-12 DIAGNOSIS — H35.363: ICD-10-CM

## 2024-02-12 DIAGNOSIS — H35.732: ICD-10-CM

## 2024-02-12 DIAGNOSIS — H25.813: ICD-10-CM

## 2024-02-12 DIAGNOSIS — H35.721: ICD-10-CM

## 2024-02-12 DIAGNOSIS — H35.3221: ICD-10-CM

## 2024-02-12 DIAGNOSIS — H35.30: ICD-10-CM

## 2024-02-12 PROCEDURE — 67028 INJECTION EYE DRUG: CPT

## 2024-02-12 PROCEDURE — 99213 OFFICE O/P EST LOW 20 MIN: CPT

## 2024-02-12 PROCEDURE — 92250 FUNDUS PHOTOGRAPHY W/I&R: CPT

## 2024-02-12 ASSESSMENT — VISUAL ACUITY
OU_CC: 20/20
OD_CC: 20/30-2
OS_CC: 20/20-2

## 2024-02-12 ASSESSMENT — TONOMETRY
OS_IOP_MMHG: 16
OD_IOP_MMHG: 15

## 2024-07-19 DIAGNOSIS — Z79.890 HORMONE REPLACEMENT THERAPY: ICD-10-CM

## 2024-07-19 RX ORDER — ESTRADIOL 0.04 MG/D
PATCH TRANSDERMAL
Qty: 24 PATCH | Refills: 0 | Status: SHIPPED | OUTPATIENT
Start: 2024-07-19 | End: 2024-07-29

## 2024-07-19 RX ORDER — ESTRADIOL 0.04 MG/D
PATCH, EXTENDED RELEASE TRANSDERMAL
Qty: 24 PATCH | Refills: 0 | OUTPATIENT
Start: 2024-07-19

## 2024-07-19 NOTE — TELEPHONE ENCOUNTER
Requested Prescriptions   Pending Prescriptions Disp Refills    CLIMARA 0.0375 MG/24HR weekly patch [Pharmacy Med Name: CLIMARA 0.0375 MG/DAY PATCH] 24 patch 3     Sig: APPLY 1 PATCH TWICE A WEEK       Contraceptives Protocol Failed - 7/19/2024 11:34 AM        Failed - Recent (12 mo) or future (30 days) visit within the authorizing provider's specialty     The patient must have completed an in-person or virtual visit within the past 12 months or has a future visit scheduled within the next 90 days with the authorizing provider s specialty.  Urgent care and e-visits do not quality as an office visit for this protocol.          Failed - Medication indicated for associated diagnosis     Medication is associated with one or more of the following diagnoses:  Contraception  Acne  Dysmenorrhea  Menorrhagia  Amenorrhea  PCOS  Premenstrual Dysphoric Disorder  Irregular menses          Passed - Patient is not a current smoker if age is 35 or older        Passed - Medication is active on med list        Passed - No active pregnancy on record        Passed - No positive pregnancy test in past 12 months       Hormone Replacement Therapy Failed - 7/19/2024 11:34 AM        Failed - Blood pressure under 140/90 in past 12 months     BP Readings from Last 3 Encounters:   05/04/23 118/70   04/25/22 128/78   06/15/21 124/84       No data recorded            Failed - Recent (12 mo) or future (90 days) visit within the authorizing provider's specialty     The patient must have completed an in-person or virtual visit within the past 12 months or has a future visit scheduled within the next 90 days with the authorizing provider s specialty.  Urgent care and e-visits do not quality as an office visit for this protocol.          Failed - Medication indicated for associated diagnosis     The medication is prescribed for one or more of the following conditions:    Menopause   Vulva/Vaginal atrophy   Low Estrogen   Gender Dysphoria   Male to  Female transgender          Passed - Medication is active on med list        Passed - Patient is 18 years of age or older        Passed - No active pregnancy on record        Passed - No positive pregnancy test on record in past 12 months       Hormone Replacement Therapy (vaginal) Failed - 7/19/2024 11:34 AM        Failed - Recent (12 mo) or future (90 days) visit within the authorizing provider's specialty     The patient must have completed an in-person or virtual visit within the past 12 months or has a future visit scheduled within the next 90 days with the authorizing provider s specialty.  Urgent care and e-visits do not quality as an office visit for this protocol.          Failed - Medication indicated for associated diagnosis     Medication is associated with one or more of the following diagnoses:     Menopause   Vulva/Vaginal atrophy   UTI prophylaxis          Passed - Medication is active on med list        Passed - Patient is 18 years of age or older        Passed - No active pregnancy on record        Passed - No positive pregnancy test on record in past 12 months           Last Written Prescription Date:  6/26/23  Last Fill Quantity: #24, 3 patches  Last office visit: 5/4/23    Future Appointments 7/19/2024 - 1/15/2025      None          Routing to schedulers to schedule patient an appointment for annual visit. Patient  is former Esthela WHITTEN CNP patient . Needs new provider.    Catina Witt RN on 7/19/2024 at 11:52 AM

## 2024-07-19 NOTE — TELEPHONE ENCOUNTER
Future Appointments 7/19/2024 - 1/15/2025        Date Visit Type Length Department Provider     8/21/2024 10:00 AM RETURN LONG (NON-OB) 30 min [facility], Renate Madrigal DO    Location Instructions:     The clinic is located at [address] [place] [address], [address] [id number]                       Medication is being filled for 1 time refill only due to:   future appt scheduled.  Catina Witt RN on 7/19/2024 at 12:03 PM

## 2024-07-19 NOTE — TELEPHONE ENCOUNTER
See te from 6/30/23:Climara 0.0375 patch to be used twice a week and not once a week. Her previous brand name Vivelle patch has been discontinued by the .

## 2024-07-27 DIAGNOSIS — Z79.890 HORMONE REPLACEMENT THERAPY: ICD-10-CM

## 2024-07-29 RX ORDER — ESTRADIOL 0.04 MG/D
1 PATCH TRANSDERMAL WEEKLY
Qty: 4 PATCH | Refills: 0 | Status: SHIPPED | OUTPATIENT
Start: 2024-07-29 | End: 2024-08-19

## 2024-07-29 NOTE — TELEPHONE ENCOUNTER
Requested Prescriptions   Pending Prescriptions Disp Refills    estradiol (CLIMARA) 0.0375 MG/24HR weekly patch [Pharmacy Med Name: ESTRADIOL 0.0375MG PATCH(1/WK)] 24 patch 0     Sig: APPLY 1 PATCH TWICE WEEKLY       Contraceptives Protocol Failed - 7/27/2024  4:49 PM        Failed - Recent (12 mo) or future (30 days) visit within the authorizing provider's specialty     The patient must have completed an in-person or virtual visit within the past 12 months or has a future visit scheduled within the next 90 days with the authorizing provider s specialty.  Urgent care and e-visits do not quality as an office visit for this protocol.          Failed - Medication indicated for associated diagnosis     Medication is associated with one or more of the following diagnoses:  Contraception  Acne  Dysmenorrhea  Menorrhagia  Amenorrhea  PCOS  Premenstrual Dysphoric Disorder  Irregular menses          Passed - Patient is not a current smoker if age is 35 or older        Passed - Medication is active on med list        Passed - No active pregnancy on record        Passed - No positive pregnancy test in past 12 months       Hormone Replacement Therapy Failed - 7/27/2024  4:49 PM        Failed - Blood pressure under 140/90 in past 12 months     BP Readings from Last 3 Encounters:   05/04/23 118/70   04/25/22 128/78   06/15/21 124/84       No data recorded            Failed - Medication indicated for associated diagnosis     The medication is prescribed for one or more of the following conditions:    Menopause   Vulva/Vaginal atrophy   Low Estrogen   Gender Dysphoria   Male to Female transgender          Passed - Medication is active on med list        Passed - Recent (12 mo) or future (90 days) visit within the authorizing provider's specialty     The patient must have completed an in-person or virtual visit within the past 12 months or has a future visit scheduled within the next 90 days with the authorizing provider s  specialty.  Urgent care and e-visits do not quality as an office visit for this protocol.          Passed - Patient is 18 years of age or older        Passed - No active pregnancy on record        Passed - No positive pregnancy test on record in past 12 months       Hormone Replacement Therapy (vaginal) Failed - 7/27/2024  4:49 PM        Failed - Recent (12 mo) or future (90 days) visit within the authorizing provider's specialty     The patient must have completed an in-person or virtual visit within the past 12 months or has a future visit scheduled within the next 90 days with the authorizing provider s specialty.  Urgent care and e-visits do not quality as an office visit for this protocol.          Failed - Medication indicated for associated diagnosis     Medication is associated with one or more of the following diagnoses:     Menopause   Vulva/Vaginal atrophy   UTI prophylaxis          Passed - Medication is active on med list        Passed - Patient is 18 years of age or older        Passed - No active pregnancy on record        Passed - No positive pregnancy test on record in past 12 months           Last Written Prescription Date:  7/19/24  Last Fill Quantity: 24,  # refills: 0   Last office visit: 5/4/2023 ; last virtual visit: Visit date not found with prescribing provider:  Smooth   Future Office Visit:  8/21/24 with Dr. Silva    Pharmacy note:  Climara is a once weekly patch.  Please rewrite so patch matches directions.  Thanks  Medication is being filled for 1 time refill only due to:  Patient needs to be seen because it has been more than one year since last visit.Appointment scheduled    Angelica Sandy RN on 7/29/2024 at 6:55 AM

## 2024-08-04 ENCOUNTER — HEALTH MAINTENANCE LETTER (OUTPATIENT)
Age: 79
End: 2024-08-04

## 2024-08-18 DIAGNOSIS — Z79.890 HORMONE REPLACEMENT THERAPY: ICD-10-CM

## 2024-08-19 RX ORDER — ESTRADIOL 0.04 MG/D
PATCH TRANSDERMAL
Qty: 4 PATCH | Refills: 0 | Status: SHIPPED | OUTPATIENT
Start: 2024-08-19 | End: 2024-08-21 | Stop reason: SINTOL

## 2024-08-19 NOTE — TELEPHONE ENCOUNTER
Requested Prescriptions   Pending Prescriptions Disp Refills    estradiol (CLIMARA) 0.0375 MG/24HR weekly patch [Pharmacy Med Name: ESTRADIOL 0.0375MG PATCH(1/WK)] 12 patch 1     Sig: APPLY 1 PATCH ONCE A WEEK       Contraceptives Protocol Failed - 8/18/2024  7:31 AM        Failed - Recent (12 mo) or future (30 days) visit within the authorizing provider's specialty     The patient must have completed an in-person or virtual visit within the past 12 months or has a future visit scheduled within the next 90 days with the authorizing provider s specialty.  Urgent care and e-visits do not quality as an office visit for this protocol.          Failed - Medication indicated for associated diagnosis     Medication is associated with one or more of the following diagnoses:  Contraception  Acne  Dysmenorrhea  Menorrhagia  Amenorrhea  PCOS  Premenstrual Dysphoric Disorder  Irregular menses  Endometriosis          Passed - Patient is not a current smoker if age is 35 or older        Passed - Medication is active on med list        Passed - No active pregnancy on record        Passed - No positive pregnancy test in past 12 months       Hormone Replacement Therapy Failed - 8/18/2024  7:31 AM        Failed - Blood pressure under 140/90 in past 12 months     BP Readings from Last 3 Encounters:   05/04/23 118/70   04/25/22 128/78   06/15/21 124/84       No data recorded            Failed - Medication indicated for associated diagnosis     The medication is prescribed for one or more of the following conditions:    Menopause   Vulva/Vaginal atrophy   Low Estrogen   Gender Dysphoria   Male to Female transgender          Passed - Medication is active on med list        Passed - Recent (12 mo) or future (90 days) visit within the authorizing provider's specialty     The patient must have completed an in-person or virtual visit within the past 12 months or has a future visit scheduled within the next 90 days with the authorizing  provider s specialty.  Urgent care and e-visits do not quality as an office visit for this protocol.          Passed - Patient is 18 years of age or older        Passed - No active pregnancy on record        Passed - No positive pregnancy test on record in past 12 months       Hormone Replacement Therapy (vaginal) Failed - 8/18/2024  7:31 AM        Failed - Recent (12 mo) or future (90 days) visit within the authorizing provider's specialty     The patient must have completed an in-person or virtual visit within the past 12 months or has a future visit scheduled within the next 90 days with the authorizing provider s specialty.  Urgent care and e-visits do not quality as an office visit for this protocol.          Failed - Medication indicated for associated diagnosis     Medication is associated with one or more of the following diagnoses:     Menopause   Vulva/Vaginal atrophy   UTI prophylaxis          Passed - Medication is active on med list        Passed - Patient is 18 years of age or older        Passed - No active pregnancy on record        Passed - No positive pregnancy test on record in past 12 months           Appt scheduled with Dr. Silva   Prescription approved per [facility] Refill Protocol.  Lelo Mora RN on 8/19/2024 at 12:49 PM

## 2024-08-21 ENCOUNTER — OFFICE VISIT (OUTPATIENT)
Dept: OBGYN | Facility: CLINIC | Age: 79
End: 2024-08-21
Payer: COMMERCIAL

## 2024-08-21 VITALS
BODY MASS INDEX: 22.88 KG/M2 | WEIGHT: 134 LBS | SYSTOLIC BLOOD PRESSURE: 126 MMHG | DIASTOLIC BLOOD PRESSURE: 80 MMHG | HEIGHT: 64 IN

## 2024-08-21 DIAGNOSIS — Z78.0 ASYMPTOMATIC MENOPAUSE: ICD-10-CM

## 2024-08-21 DIAGNOSIS — Z79.890 HORMONE REPLACEMENT THERAPY: ICD-10-CM

## 2024-08-21 DIAGNOSIS — Z01.419 ENCOUNTER FOR BREAST AND PELVIC EXAMINATION: Primary | ICD-10-CM

## 2024-08-21 PROCEDURE — 99213 OFFICE O/P EST LOW 20 MIN: CPT | Mod: 25 | Performed by: OBSTETRICS & GYNECOLOGY

## 2024-08-21 PROCEDURE — 99459 PELVIC EXAMINATION: CPT | Performed by: OBSTETRICS & GYNECOLOGY

## 2024-08-21 PROCEDURE — G0101 CA SCREEN;PELVIC/BREAST EXAM: HCPCS | Performed by: OBSTETRICS & GYNECOLOGY

## 2024-08-21 RX ORDER — ESTRADIOL 0.03 MG/D
1 FILM, EXTENDED RELEASE TRANSDERMAL
Qty: 24 PATCH | Refills: 4 | Status: SHIPPED | OUTPATIENT
Start: 2024-08-22 | End: 2024-08-27

## 2024-08-21 ASSESSMENT — ANXIETY QUESTIONNAIRES
GAD7 TOTAL SCORE: 0
IF YOU CHECKED OFF ANY PROBLEMS ON THIS QUESTIONNAIRE, HOW DIFFICULT HAVE THESE PROBLEMS MADE IT FOR YOU TO DO YOUR WORK, TAKE CARE OF THINGS AT HOME, OR GET ALONG WITH OTHER PEOPLE: NOT DIFFICULT AT ALL
7. FEELING AFRAID AS IF SOMETHING AWFUL MIGHT HAPPEN: NOT AT ALL
5. BEING SO RESTLESS THAT IT IS HARD TO SIT STILL: NOT AT ALL
1. FEELING NERVOUS, ANXIOUS, OR ON EDGE: NOT AT ALL
GAD7 TOTAL SCORE: 0
6. BECOMING EASILY ANNOYED OR IRRITABLE: NOT AT ALL
3. WORRYING TOO MUCH ABOUT DIFFERENT THINGS: NOT AT ALL
2. NOT BEING ABLE TO STOP OR CONTROL WORRYING: NOT AT ALL

## 2024-08-21 ASSESSMENT — PATIENT HEALTH QUESTIONNAIRE - PHQ9
5. POOR APPETITE OR OVEREATING: NOT AT ALL
SUM OF ALL RESPONSES TO PHQ QUESTIONS 1-9: 0

## 2024-08-21 NOTE — PROGRESS NOTES
SUBJECTIVE:                                                   Cheyenne Mckeon is a 78 year old female who presents to clinic today for the following health issue(s):  Patient presents with:  Physical      Additional information: breast and pelvic exam     HPI:  Uses ERT, feels this helps with her sleep and likes the benefit for bones.   [facility] has been out of stock and using alternative type. She notices the alternative gives her palpitations. She cuts it in half.   Since being off for last week or so, has been more hot, sleeping more poorly.     Mammo at [facility], scheduled next mo.    Colonoscopy every 3yrs.     Last bone density . Does not want to do any meds at all. Does not want to get the dexa.     Sees Derm next Friday. Dr. Sanabria     No LMP recorded. Patient has had a hysterectomy..     Patient is not sexually active, .  Using hysterectomy for contraception.    reports that she has quit smoking. Her smoking use included cigarettes. She has a 8 pack-year smoking history. She quit smokeless tobacco use about 42 years ago.    STD testing offered?  Declined    Health maintenance updated:  yes  Care Gaps  Close care gaps  Overdue          Never done HEPATITIS C SCREENING (Once)     Never done LUNG CANCER SCREENING (Yearly)     Never done RSV VACCINE (Pregnancy & 60+) (1 - 1-dose 60+ series)     OCT 24  2018 MEDICARE ANNUAL WELLNESS VISIT (Yearly)  Last completed: Oct 24, 2017          DEC 12  2021 GLUCOSE (Every 3 Years)  Last completed: Dec 12, 2018     AUG 29  2023 MAMMO SCREENING (Yearly)   Last completed: Aug 29, 2022     SEP 1  2023 COVID-19 Vaccine (3 - 2023-24 season)  Last completed: Mar 23, 2021                   Upcoming          SEP 1  2024 INFLUENZA VACCINE (1)  Last completed: Oct 10, 2022     SEP 21  2025 COLORECTAL CANCER SCREENING (COLONOSCOPY - Required) (Every 3 Years)   Last completed: Sep 21, 2022     MAY 4  2028 ADVANCE CARE PLANNING (Every 5 Years)  Last completed: May 4, 2023     JUL  16 2030 DEXA (Every 15 Years)  Last completed: Jul 16, 2015     MAY 25  2033 DTAP/TDAP/TD IMMUNIZATION (3 - Td or Tdap)   Last completed: May 25, 2023     Today's PHQ-2 Score:       8/21/2024    10:06 AM   PHQ-2 ( 1999 Pfizer)   Q1: Little interest or pleasure in doing things 0   Q2: Feeling down, depressed or hopeless 0   PHQ-2 Score 0     Today's PHQ-9 Score:       8/21/2024    10:06 AM   PHQ-9 SCORE   PHQ-9 Total Score 0     Today's DIDIER-7 Score:       8/21/2024    10:06 AM   DIDIER-7 SCORE   Total Score 0       Problem list and histories reviewed & adjusted, as indicated.  Additional history: as documented.    Patient Active Problem List   Diagnosis    IBS (irritable bowel syndrome)    Diverticulitis of colon    OAB (overactive bladder)     Past Surgical History:   Procedure Laterality Date    ABDOMINOPLASTY  1998    HYSTERECTOMY, EDEN  1998    PARATHYROIDECTOMY  2004    [facility] BREAST AUGMENTATION        Social History     Tobacco Use    Smoking status: Former     Current packs/day: 1.00     Average packs/day: 1 pack/day for 8.0 years (8.0 ttl pk-yrs)     Types: Cigarettes    Smokeless tobacco: Former     Quit date: 9/13/1981   Substance Use Topics    Alcohol use: Yes     Comment: very light      No data available              Current Outpatient Medications   Medication Sig Dispense Refill    cycloSPORINE (RESTASIS) 0.05 % ophthalmic emulsion 1 drop as needed.      [START ON 8/22/2024] estradiol (VIVELLE-DOT) 0.025 MG/24HR bi-weekly patch Place 1 patch over 96 hours onto the skin twice a week. 24 patch 4    Multiple Vitamins-Minerals (MULTIVITAL) TABS Take 1 tablet by mouth daily.      rosuvastatin (CRESTOR) 5 MG tablet Take 2.5 mg by mouth      valACYclovir (VALTREX) 1000 mg tablet Take 1 tablet (1,000 mg) by mouth daily 90 tablet 1     No current facility-administered medications for this visit.     Allergies   Allergen Reactions    Flagyl [Metronidazole Hcl]      Thrush and gerd    Macrobid [Nitrofurantoin]   "      ROS:  12 point review of systems negative other than symptoms noted below or in the HPI.  No urinary frequency or dysuria, bladder or kidney problems      OBJECTIVE:     /80   Ht 1.626 m (5' 4\")   Wt 60.8 kg (134 lb)   BMI 23.00 kg/m    Body mass index is 23 kg/m .    Exam:  Constitutional:  Appearance: Well nourished, well developed alert, in no acute distress  Breasts:  Inspection of Breasts:  Symmetric bilaterally.  No puckering.  No skin changes.  Palpation of Breasts and Axillae:  No masses present on palpation, no breast tenderness Axillary Lymph Nodes:  No lymphadenopathy present  Neurologic:  Mental Status:  Oriented X3.  Normal strength and tone, sensory exam grossly normal, mentation intact and speech normal.    Psychiatric:  Mentation appears normal and affect normal/bright.   Pelvic Exam:  External Genitalia:                Normal appearance for age, no discharge present, no tenderness present, no inflammatory lesions present, color normal  Vagina:                Normal vaginal vault without central or paravaginal defects, no discharge present, no inflammatory lesions present, no masses present  Bladder:                Nontender to palpation  Urethra:              Urethral Body:  Urethra palpation normal, urethra structural support normal              Urethral Meatus:  No erythema or lesions present  Cervix:                Appearance healthy, no lesions present, nontender to palpation, no bleeding present  Uterus:                Uterus: firm, normal sized and nontender, midplane in position.   Adnexa:                No adnexal tenderness present, no adnexal masses present  Perineum:                Perineum within normal limits, no evidence of trauma, no rashes or skin lesions present  Anus:                Anus within normal limits, no hemorrhoids present  Inguinal Lymph Nodes:                No lymphadenopathy present  Pubic Hair:                Normal pubic hair distribution for " age  Genitalia and Groin:                No rashes present, 3x1mm gray/brown elevated condylomatous appearing lesion just to left of anus.    In-Clinic Test Results:  No results found for this or any previous visit (from the past 24 hour(s)).    ASSESSMENT/PLAN:                                                        ICD-10-CM    1. Encounter for breast and pelvic examination  Z01.419       2. Hormone replacement therapy  Z79.890 estradiol (VIVELLE-DOT) 0.025 MG/24HR bi-weekly patch      3. Post-menopause on HRT (hormone replacement therapy)  Z79.890 estradiol (VIVELLE-DOT) 0.025 MG/24HR bi-weekly patch      4. Asymptomatic menopause  Z78.0 DX Bone Density          Patient Instructions   -Daily total calcium intake (between food/supplements) should be 1200mg which equates to 5 servings calcium containing food per day; VItamin D 1000IU.   Foods rich in calcium are: milk, cheese, yogurt, seafood, sardines and canned salmon, leafy green vegetables such as noah greens, spinach and kale, beans and lentils, almonds, seeds (poppy, sesame, celery, francesco), rhubarb, dried fruit such as figs, whey protein, tofu and edamame, amaranth, other foods with added calcium such as orange juice and some cereals.   If adequate amount not taken in diet, then a supplement may be needed.     -I also recommend increasing your dietary fiber by starting Metamucil (powder mixed in glass of water) once to twice daily      -UT for cervical cancer screening.   -Breast self awareness discussed. UTD for mammogram.  -Osteoporosis prevention discussed. Discussed treatments, how these are determined and why would be recommended. Discussed individualizing tx plans for each person.   She agrees to schedule a bone density.   -PCP manages general health maintenance and labs  -Hx supracervical hst/bso, on long term ERT. Has recently had break and done well, prior to this would cut her 0.0375 patches in half. Discussed likely getting sub therapeutic dose.  Discusse pros and cons of continuing ERT. She is thankful for the conversation. Will consider. For now may stay on. Discussed trial of lower dose vivelle   -Return 1-2yr for next breast/pelvicexam  -New ?condyloma of inner buttock on left. Discussed excision and path eval. She sees Derm soon, will get their opnion as well. IF she desires to proceed with excision, will mychart back and will find place for the procedure.     (25 minutes of 45 was spent on the date of the encounter doing chart review, review of chart, history and/or exam, documentation, patient counseling.)      Renate Madrigal Masters, DO  [facility] [place]

## 2024-08-21 NOTE — PATIENT INSTRUCTIONS
-Daily total calcium intake (between food/supplements) should be 1200mg which equates to 5 servings calcium containing food per day; VItamin D 1000IU.   Foods rich in calcium are: milk, cheese, yogurt, seafood, sardines and canned salmon, leafy green vegetables such as noah greens, spinach and kale, beans and lentils, almonds, seeds (poppy, sesame, celery, francesco), rhubarb, dried fruit such as figs, whey protein, tofu and edamame, amaranth, other foods with added calcium such as orange juice and some cereals.   If adequate amount not taken in diet, then a supplement may be needed.     -I also recommend increasing your dietary fiber by starting Metamucil (powder mixed in glass of water) once to twice daily

## 2024-08-25 DIAGNOSIS — Z79.890 HORMONE REPLACEMENT THERAPY: ICD-10-CM

## 2024-08-27 RX ORDER — ESTRADIOL 0.03 MG/D
1 FILM, EXTENDED RELEASE TRANSDERMAL
Qty: 24 PATCH | Refills: 4 | OUTPATIENT
Start: 2024-08-29

## 2024-08-27 RX ORDER — ESTRADIOL 0.03 MG/D
1 FILM, EXTENDED RELEASE TRANSDERMAL
Qty: 24 PATCH | Refills: 4 | Status: SHIPPED | OUTPATIENT
Start: 2024-08-29

## 2024-08-27 NOTE — TELEPHONE ENCOUNTER
-Hx supracervical hst/bso, on long term ERT. Has recently had break and done well, prior to this would cut her 0.0375 patches in half. Discussed likely getting sub therapeutic dose. Discusse pros and cons of continuing ERT. She is thankful for the conversation. Will consider. For now may stay on. Discussed trial of lower dose [facility] called and spoke to pharmacist - [facility] does not carry this dose. He usually tells us this and then the Rx is either sent elsewhere or new dose sent.    Pt willing to send to [facility] to try this location. Rx sent to [facility]    Pt wanted to let Dr Silva know she scheduled a Dexa.    Mandy Sifuentes RN on 8/27/2024 at 10:44 AM

## 2024-10-15 ENCOUNTER — ANCILLARY PROCEDURE (OUTPATIENT)
Dept: BONE DENSITY | Facility: CLINIC | Age: 79
End: 2024-10-15
Attending: OBSTETRICS & GYNECOLOGY
Payer: COMMERCIAL

## 2024-10-15 DIAGNOSIS — Z78.0 ASYMPTOMATIC MENOPAUSE: ICD-10-CM

## 2024-10-15 PROCEDURE — 77080 DXA BONE DENSITY AXIAL: CPT | Mod: TC | Performed by: PHYSICIAN ASSISTANT

## 2024-10-15 PROCEDURE — 77081 DXA BONE DENSITY APPENDICULR: CPT | Mod: TC | Performed by: PHYSICIAN ASSISTANT

## 2024-12-18 ENCOUNTER — COMPREHENSIVE EXAM (OUTPATIENT)
Age: 79
End: 2024-12-18

## 2024-12-18 DIAGNOSIS — H35.363: ICD-10-CM

## 2024-12-18 DIAGNOSIS — H35.3221: ICD-10-CM

## 2024-12-18 DIAGNOSIS — H35.30: ICD-10-CM

## 2024-12-18 DIAGNOSIS — H25.813: ICD-10-CM

## 2024-12-18 DIAGNOSIS — H35.3112: ICD-10-CM

## 2024-12-18 DIAGNOSIS — H35.721: ICD-10-CM

## 2024-12-18 DIAGNOSIS — H35.732: ICD-10-CM

## 2024-12-18 PROCEDURE — 67028 INJECTION EYE DRUG: CPT

## 2024-12-18 PROCEDURE — 92250 FUNDUS PHOTOGRAPHY W/I&R: CPT

## 2024-12-18 PROCEDURE — 99213 OFFICE O/P EST LOW 20 MIN: CPT | Mod: 25

## 2025-02-26 ENCOUNTER — COMPREHENSIVE EXAM (OUTPATIENT)
Age: 80
End: 2025-02-26

## 2025-02-26 DIAGNOSIS — H35.3112: ICD-10-CM

## 2025-02-26 DIAGNOSIS — H35.30: ICD-10-CM

## 2025-02-26 DIAGNOSIS — H04.123: ICD-10-CM

## 2025-02-26 DIAGNOSIS — H25.813: ICD-10-CM

## 2025-02-26 DIAGNOSIS — H35.732: ICD-10-CM

## 2025-02-26 DIAGNOSIS — H35.363: ICD-10-CM

## 2025-02-26 DIAGNOSIS — H35.721: ICD-10-CM

## 2025-02-26 DIAGNOSIS — H35.3221: ICD-10-CM

## 2025-02-26 PROCEDURE — 67028 INJECTION EYE DRUG: CPT

## 2025-02-26 PROCEDURE — 92134 CPTRZ OPH DX IMG PST SGM RTA: CPT

## 2025-02-26 PROCEDURE — 92273 FULL FIELD ERG W/I&R: CPT

## 2025-02-26 PROCEDURE — 99213 OFFICE O/P EST LOW 20 MIN: CPT | Mod: 25

## 2025-04-18 ENCOUNTER — TRANSFERRED RECORDS (OUTPATIENT)
Dept: HEALTH INFORMATION MANAGEMENT | Facility: CLINIC | Age: 80
End: 2025-04-18
Payer: COMMERCIAL

## 2025-07-10 ENCOUNTER — TRANSFERRED RECORDS (OUTPATIENT)
Dept: HEALTH INFORMATION MANAGEMENT | Facility: CLINIC | Age: 80
End: 2025-07-10
Payer: COMMERCIAL

## 2025-08-14 ENCOUNTER — TRANSFERRED RECORDS (OUTPATIENT)
Dept: HEALTH INFORMATION MANAGEMENT | Facility: CLINIC | Age: 80
End: 2025-08-14
Payer: COMMERCIAL

## 2025-08-16 ENCOUNTER — HEALTH MAINTENANCE LETTER (OUTPATIENT)
Age: 80
End: 2025-08-16

## 2025-09-04 DIAGNOSIS — Z79.890 HORMONE REPLACEMENT THERAPY: ICD-10-CM

## 2025-09-04 RX ORDER — ESTRADIOL 0.03 MG/D
1 FILM, EXTENDED RELEASE TRANSDERMAL
Qty: 24 PATCH | Refills: 0 | OUTPATIENT
Start: 2025-09-04